# Patient Record
Sex: FEMALE | Race: WHITE | NOT HISPANIC OR LATINO | Employment: UNEMPLOYED | URBAN - METROPOLITAN AREA
[De-identification: names, ages, dates, MRNs, and addresses within clinical notes are randomized per-mention and may not be internally consistent; named-entity substitution may affect disease eponyms.]

---

## 2018-03-17 ENCOUNTER — HOSPITAL ENCOUNTER (OUTPATIENT)
Dept: MRI IMAGING | Facility: HOSPITAL | Age: 38
Discharge: HOME | End: 2018-03-17
Attending: NURSE PRACTITIONER | Admitting: NURSE PRACTITIONER

## 2018-03-26 ENCOUNTER — HOSPITAL ENCOUNTER (OUTPATIENT)
Dept: ONCOLOGY | Facility: CLINIC | Age: 38
Setting detail: INFUSION SERIES
Discharge: HOME | End: 2018-03-26
Attending: INTERNAL MEDICINE | Admitting: INTERNAL MEDICINE

## 2018-03-26 ENCOUNTER — CLINICAL SUPPORT (OUTPATIENT)
Dept: ONCOLOGY | Facility: HOSPITAL | Age: 38
End: 2018-03-26

## 2018-03-26 ENCOUNTER — HOSPITAL ENCOUNTER (OUTPATIENT)
Dept: ONCOLOGY | Facility: HOSPITAL | Age: 38
Discharge: HOME | End: 2018-03-26
Attending: INTERNAL MEDICINE | Admitting: INTERNAL MEDICINE

## 2018-03-26 NOTE — PROGRESS NOTES
PATIENTS ONCOLOGY RECORD LOCATED IN [facility]      Subjective     Name:  YANETH HOYT     Date:  2018  Address:  [address] [place] [address]  Home: [phone]  :  1980 AGE:  37 y.o.        RECORDS OBTAINED:  Patients Oncology Record is located in [facility]

## 2018-03-31 ENCOUNTER — HOSPITAL ENCOUNTER (OUTPATIENT)
Dept: ULTRASOUND IMAGING | Facility: HOSPITAL | Age: 38
Discharge: HOME | End: 2018-03-31
Attending: NURSE PRACTITIONER | Admitting: NURSE PRACTITIONER

## 2018-04-12 ENCOUNTER — HOSPITAL ENCOUNTER (OUTPATIENT)
Dept: LAB | Facility: HOSPITAL | Age: 38
Discharge: HOME | End: 2018-04-12
Attending: INTERNAL MEDICINE | Admitting: INTERNAL MEDICINE

## 2018-04-14 ENCOUNTER — HOSPITAL ENCOUNTER (OUTPATIENT)
Dept: CT IMAGING | Facility: HOSPITAL | Age: 38
Discharge: HOME | End: 2018-04-14
Attending: NURSE PRACTITIONER | Admitting: NURSE PRACTITIONER

## 2018-04-17 ENCOUNTER — CLINICAL SUPPORT (OUTPATIENT)
Dept: ONCOLOGY | Facility: HOSPITAL | Age: 38
End: 2018-04-17

## 2018-04-17 ENCOUNTER — HOSPITAL ENCOUNTER (OUTPATIENT)
Dept: ONCOLOGY | Facility: CLINIC | Age: 38
Setting detail: INFUSION SERIES
Discharge: HOME | End: 2018-04-17
Attending: INTERNAL MEDICINE | Admitting: INTERNAL MEDICINE

## 2018-04-17 NOTE — PROGRESS NOTES
PATIENTS ONCOLOGY RECORD LOCATED IN [facility]      Subjective     Name:  YANETH HOYT     Date:  2018  Address:  [address] [place] [address]  Home: [phone]  :  1980 AGE:  37 y.o.        RECORDS OBTAINED:  Patients Oncology Record is located in [facility]

## 2018-05-21 ENCOUNTER — CLINICAL SUPPORT (OUTPATIENT)
Dept: ONCOLOGY | Facility: HOSPITAL | Age: 38
End: 2018-05-21

## 2018-05-21 ENCOUNTER — HOSPITAL ENCOUNTER (OUTPATIENT)
Dept: LAB | Facility: HOSPITAL | Age: 38
Discharge: HOME | End: 2018-05-21
Attending: INTERNAL MEDICINE | Admitting: INTERNAL MEDICINE

## 2018-05-21 ENCOUNTER — HOSPITAL ENCOUNTER (OUTPATIENT)
Dept: ONCOLOGY | Facility: CLINIC | Age: 38
Setting detail: INFUSION SERIES
Discharge: HOME | End: 2018-05-21
Attending: INTERNAL MEDICINE | Admitting: INTERNAL MEDICINE

## 2018-05-21 NOTE — PROGRESS NOTES
PATIENTS ONCOLOGY RECORD LOCATED IN [facility]      Subjective     Name:  YANETH HOYT     Date:  2018  Address:  [address] [place] [address]  Home: [phone]  :  1980 AGE:  38 y.o.        RECORDS OBTAINED:  Patients Oncology Record is located in [facility]

## 2018-05-24 LAB
JAK2 P.V617F BLD/T QL: NORMAL

## 2019-06-11 ENCOUNTER — HOSPITAL ENCOUNTER (OUTPATIENT)
Dept: SLEEP MEDICINE | Facility: HOSPITAL | Age: 39
Discharge: HOME | End: 2019-06-11
Attending: INTERNAL MEDICINE | Admitting: INTERNAL MEDICINE

## 2019-06-11 ENCOUNTER — OUTSIDE FACILITY SERVICE (OUTPATIENT)
Dept: SLEEP MEDICINE | Facility: HOSPITAL | Age: 39
End: 2019-06-11

## 2019-06-11 PROCEDURE — OUTSIDEPOS PR OUTSIDE POS PLACEHOLDER: Performed by: INTERNAL MEDICINE

## 2019-06-15 ENCOUNTER — HOSPITAL ENCOUNTER (OUTPATIENT)
Dept: SLEEP MEDICINE | Facility: HOSPITAL | Age: 39
Discharge: HOME | End: 2019-06-15
Admitting: INTERNAL MEDICINE

## 2019-06-15 DIAGNOSIS — G47.10 HYPERSOMNIA: ICD-10-CM

## 2019-06-15 PROCEDURE — 95806 SLEEP STUDY UNATT&RESP EFFT: CPT | Performed by: INTERNAL MEDICINE

## 2019-06-15 PROCEDURE — 95811 POLYSOM 6/>YRS CPAP 4/> PARM: CPT

## 2019-06-15 PROCEDURE — 95806 SLEEP STUDY UNATT&RESP EFFT: CPT

## 2019-06-16 VITALS
OXYGEN SATURATION: 98 % | RESPIRATION RATE: 18 BRPM | HEIGHT: 61 IN | HEART RATE: 80 BPM | BODY MASS INDEX: 45.5 KG/M2 | WEIGHT: 241 LBS

## 2019-06-18 ENCOUNTER — OUTSIDE FACILITY SERVICE (OUTPATIENT)
Dept: SLEEP MEDICINE | Facility: HOSPITAL | Age: 39
End: 2019-06-18

## 2019-06-18 PROCEDURE — 95806 SLEEP STUDY UNATT&RESP EFFT: CPT | Performed by: INTERNAL MEDICINE

## 2019-09-09 ENCOUNTER — TRANSCRIBE ORDERS (OUTPATIENT)
Dept: MAMMOGRAPHY | Facility: HOSPITAL | Age: 39
End: 2019-09-09

## 2019-09-09 DIAGNOSIS — Z12.39 SCREENING BREAST EXAMINATION: Primary | ICD-10-CM

## 2019-09-12 ENCOUNTER — APPOINTMENT (OUTPATIENT)
Dept: MAMMOGRAPHY | Facility: HOSPITAL | Age: 39
End: 2019-09-12

## 2019-09-16 ENCOUNTER — HOSPITAL ENCOUNTER (OUTPATIENT)
Dept: MAMMOGRAPHY | Facility: HOSPITAL | Age: 39
Discharge: HOME | End: 2019-09-16
Admitting: NURSE PRACTITIONER

## 2019-09-16 DIAGNOSIS — Z12.39 SCREENING BREAST EXAMINATION: ICD-10-CM

## 2019-09-16 PROCEDURE — 77063 BREAST TOMOSYNTHESIS BI: CPT

## 2019-09-16 PROCEDURE — 77067 SCR MAMMO BI INCL CAD: CPT

## 2020-10-13 ENCOUNTER — TRANSCRIBE ORDERS (OUTPATIENT)
Dept: ADMINISTRATIVE | Facility: HOSPITAL | Age: 40
End: 2020-10-13

## 2020-10-13 ENCOUNTER — HOSPITAL ENCOUNTER (OUTPATIENT)
Dept: CARDIOLOGY | Facility: HOSPITAL | Age: 40
Discharge: HOME | End: 2020-10-13
Admitting: OBSTETRICS & GYNECOLOGY

## 2020-10-13 DIAGNOSIS — Z01.818 PRE-OP TESTING: ICD-10-CM

## 2020-10-13 DIAGNOSIS — Z01.818 PRE-OP TESTING: Primary | ICD-10-CM

## 2020-10-13 PROCEDURE — 93010 ELECTROCARDIOGRAM REPORT: CPT | Performed by: INTERNAL MEDICINE

## 2020-10-13 PROCEDURE — 93005 ELECTROCARDIOGRAM TRACING: CPT | Performed by: OBSTETRICS & GYNECOLOGY

## 2020-10-16 ENCOUNTER — TELEPHONE (OUTPATIENT)
Dept: CARDIOLOGY | Facility: CLINIC | Age: 40
End: 2020-10-16

## 2020-10-16 NOTE — TELEPHONE ENCOUNTER
RECEIVED REFERRAL/ CARDIAC CLEARANCE FROM [facility]. PATIENT'S SURGERY 10/20/2020. CALLED PATIENT, SHE SAID THERE WAS A MIX UP AND SHE NEEDS THIS APT AFTER HER SURGERY. DOES NOT NEED CARDIAC CLEARANCE. MADE APT 10/23/2020

## 2020-10-20 RX ORDER — TRIAMTERENE CAPSULES 50 MG/1
50 CAPSULE ORAL DAILY
COMMUNITY
Start: 2020-10-07 | End: 2020-11-06

## 2020-10-20 RX ORDER — BLOOD-GLUCOSE METER
1 EACH MISCELLANEOUS 2 TIMES DAILY
COMMUNITY
Start: 2020-10-06 | End: 2020-11-06 | Stop reason: ALTCHOICE

## 2020-10-20 RX ORDER — AMLODIPINE BESYLATE 10 MG/1
10 TABLET ORAL DAILY
COMMUNITY
Start: 2020-07-28 | End: 2020-11-06

## 2020-10-20 RX ORDER — FLUOXETINE HYDROCHLORIDE 40 MG/1
40 CAPSULE ORAL DAILY
COMMUNITY
Start: 2020-09-28

## 2020-10-20 RX ORDER — HYDROCHLOROTHIAZIDE 12.5 MG/1
12.5 TABLET ORAL DAILY
COMMUNITY
Start: 2020-10-06 | End: 2020-11-06 | Stop reason: ALTCHOICE

## 2020-10-20 RX ORDER — TRIAMTERENE AND HYDROCHLOROTHIAZIDE 37.5; 25 MG/1; MG/1
1 TABLET ORAL DAILY
COMMUNITY
Start: 2020-07-28

## 2020-10-20 RX ORDER — ONDANSETRON 4 MG/1
4 TABLET, FILM COATED ORAL 3 TIMES DAILY PRN
COMMUNITY
Start: 2020-10-06

## 2020-10-20 RX ORDER — IBUPROFEN 800 MG/1
800 TABLET ORAL 3 TIMES DAILY
COMMUNITY
Start: 2020-10-06

## 2020-11-06 ENCOUNTER — OFFICE VISIT (OUTPATIENT)
Dept: CARDIOLOGY | Facility: CLINIC | Age: 40
End: 2020-11-06

## 2020-11-06 VITALS
HEART RATE: 88 BPM | TEMPERATURE: 96.9 F | DIASTOLIC BLOOD PRESSURE: 89 MMHG | OXYGEN SATURATION: 100 % | SYSTOLIC BLOOD PRESSURE: 134 MMHG | HEIGHT: 61 IN | BODY MASS INDEX: 47.11 KG/M2 | WEIGHT: 249.5 LBS

## 2020-11-06 DIAGNOSIS — R06.02 SHORTNESS OF BREATH: ICD-10-CM

## 2020-11-06 DIAGNOSIS — G47.33 OBSTRUCTIVE SLEEP APNEA: ICD-10-CM

## 2020-11-06 DIAGNOSIS — I10 ESSENTIAL HYPERTENSION: Primary | ICD-10-CM

## 2020-11-06 PROBLEM — Z86.69 HISTORY OF MIGRAINE: Status: ACTIVE | Noted: 2020-11-06

## 2020-11-06 PROCEDURE — 99203 OFFICE O/P NEW LOW 30 MIN: CPT | Performed by: INTERNAL MEDICINE

## 2020-11-06 RX ORDER — LORATADINE 10 MG/1
TABLET ORAL
COMMUNITY
Start: 2020-10-13

## 2020-11-06 RX ORDER — TAMSULOSIN HYDROCHLORIDE 0.4 MG/1
1 CAPSULE ORAL
COMMUNITY
Start: 2020-10-27

## 2020-11-06 RX ORDER — OXYCODONE HYDROCHLORIDE AND ACETAMINOPHEN 5; 325 MG/1; MG/1
1 TABLET ORAL EVERY 4 HOURS PRN
COMMUNITY
Start: 2020-10-28

## 2020-11-06 NOTE — PROGRESS NOTES
"    Subjective:     Encounter Date:11/06/2020      Patient ID: Nataliya Wesley is a 40 y.o. female.    Chief Complaint:  History of Present Illness-40 year-old white female with history of hypertension and status post hysterectomy history of sleep apnea presents to my office for a new consultation.  Patient has been having symptoms of shortness of breath and occasional dizziness.  No complains of any chest pain.  No PND orthopnea.  No palpitations syncope.  She has some swelling of the feet.  She has very high blood pressure and was placed on medical therapy but she still continues to have high blood pressure.  She says that she is taking her meds regularly.  She is also using a sleep apnea machine.  She is notable to exercise because of her recent hysterectomy.  /89   Pulse 88   Temp 96.9 °F (36.1 °C)   Ht 154.9 cm (61\")   Wt 113 kg (249 lb 8 oz)   SpO2 100%   BMI 47.14 kg/m²     The following portions of the patient's history were reviewed and updated as appropriate: allergies, current medications, past family history, past medical history, past social history, past surgical history and problem list.  Past Medical History:   Diagnosis Date   • Hypertension      Past Surgical History:   Procedure Laterality Date   • HYSTERECTOMY  10/20/2020     Social History     Socioeconomic History   • Marital status:      Spouse name: Not on file   • Number of children: Not on file   • Years of education: Not on file   • Highest education level: Not on file   Tobacco Use   • Smoking status: Never Smoker   • Smokeless tobacco: Never Used   Substance and Sexual Activity   • Alcohol use: Not Currently     Comment: once a year    • Drug use: Never     Family History   Problem Relation Age of Onset   • Hypertension Mother        Current Outpatient Medications:   •  FLUoxetine (PROzac) 40 MG capsule, Take 40 mg by mouth Daily., Disp: , Rfl:   •  ibuprofen (ADVIL,MOTRIN) 800 MG tablet, Take 800 mg by mouth 3 " (Three) Times a Day., Disp: , Rfl:   •  loratadine (CLARITIN) 10 MG tablet, , Disp: , Rfl:   •  Mirabegron ER (Myrbetriq) 25 MG tablet sustained-release 24 hour 24 hr tablet, Take 25 mg by mouth Daily., Disp: , Rfl:   •  ondansetron (ZOFRAN) 4 MG tablet, Take 4 mg by mouth 3 (Three) Times a Day As Needed., Disp: , Rfl:   •  oxyCODONE-acetaminophen (PERCOCET) 5-325 MG per tablet, Take 1 tablet by mouth Every 4 (Four) Hours As Needed., Disp: , Rfl:   •  tamsulosin (FLOMAX) 0.4 MG capsule 24 hr capsule, Take 1 capsule by mouth every night at bedtime., Disp: , Rfl:   •  triamterene-hydrochlorothiazide (MAXZIDE-25) 37.5-25 MG per tablet, Take 1 tablet by mouth Daily., Disp: , Rfl:   No Known Allergies    Review of Systems   Constitution: Negative for malaise/fatigue.   HENT: Negative for ear pain and nosebleeds.    Cardiovascular: Positive for leg swelling (ankles and feet). Negative for chest pain, palpitations and syncope.   Respiratory: Positive for shortness of breath. Negative for cough.    Hematologic/Lymphatic: Bruises/bleeds easily.   Skin: Negative for rash.   Musculoskeletal: Negative for joint pain.   Gastrointestinal: Positive for nausea. Negative for abdominal pain and vomiting.   Neurological: Positive for light-headedness. Negative for headaches and numbness.   Psychiatric/Behavioral: Negative for depression. The patient is not nervous/anxious.               Objective:     Constitutional:       Appearance: Well-developed.   Eyes:      General: No scleral icterus.     Conjunctiva/sclera: Conjunctivae normal.      Pupils: Pupils are equal, round, and reactive to light.   HENT:      Head: Normocephalic and atraumatic.   Neck:      Musculoskeletal: Normal range of motion and neck supple.      Vascular: No carotid bruit or JVD.   Pulmonary:      Effort: Pulmonary effort is normal.      Breath sounds: Normal breath sounds. No wheezing. No rales.   Cardiovascular:      Normal rate. Regular rhythm.   Pulses:      Intact distal pulses.   Abdominal:      General: Bowel sounds are normal.      Palpations: Abdomen is soft.   Musculoskeletal: Normal range of motion.   Skin:     General: Skin is warm and dry.      Findings: No rash.   Neurological:      Mental Status: Alert.      Comments: No focal deficits         Procedures    Lab Review:       Assessment:          Diagnosis Plan   1. Essential hypertension     2. Shortness of breath     3. Obstructive sleep apnea            Plan:      Patient has history of hypertension her blood pressure still high in spite of being on 2 medications  Pain is sleep apnea using a CPAP machine  Patient also has been having some shortness of breath  Patient will be started on metoprolol 1 mg once a day and will have an echocardiogram for LV function valve abnormalities  Patient is advised to exercise regularly and decrease the salt intake and also use a CPAP machine regularly.

## 2020-11-06 NOTE — PROGRESS NOTES
"Encounter Date:11/06/2020      Patient ID: Nataliya Wesley is a 40 y.o. female.    Chief Complaint:      History of Present Illness      Assessment and Plan               No diagnosis found.LAB RESULTS (LAST 7 DAYS)    CBC        BMP        CMP         BNP        TROPONIN        CoAg        Creatinine Clearance  CrCl cannot be calculated (No successful lab value found.).    ABG        Radiology  No radiology results for the last day                The following portions of the patient's history were reviewed and updated as appropriate: {history reviewed:20406::\"allergies\",\"current medications\",\"past family history\",\"past medical history\",\"past social history\",\"past surgical history\",\"problem list\"}.    Review of Systems   Constitution: Negative for malaise/fatigue.   Cardiovascular: Negative for chest pain, leg swelling, palpitations and syncope.   Respiratory: Negative for shortness of breath.    Skin: Negative for rash.   Gastrointestinal: Negative for nausea and vomiting.   Neurological: Negative for dizziness, light-headedness and numbness.         Current Outpatient Medications:   •  amLODIPine (NORVASC) 10 MG tablet, Take 10 mg by mouth Daily., Disp: , Rfl:   •  Blood Glucose Monitoring Suppl (Accu-Chek Guide) w/Device kit, 1 strip by Other route 2 (Two) Times a Day. to check glucose, Disp: , Rfl:   •  FLUoxetine (PROzac) 40 MG capsule, Take 40 mg by mouth Daily., Disp: , Rfl:   •  hydroCHLOROthiazide (HYDRODIURIL) 12.5 MG tablet, Take 12.5 mg by mouth Daily., Disp: , Rfl:   •  ibuprofen (ADVIL,MOTRIN) 800 MG tablet, Take 800 mg by mouth 3 (Three) Times a Day., Disp: , Rfl:   •  ondansetron (ZOFRAN) 4 MG tablet, Take 4 mg by mouth 3 (Three) Times a Day As Needed., Disp: , Rfl:   •  triamterene (DYRENIUM) 50 MG capsule, Take 50 mg by mouth Daily., Disp: , Rfl:   •  triamterene-hydrochlorothiazide (MAXZIDE-25) 37.5-25 MG per tablet, Take 1 tablet by mouth Daily., Disp: , Rfl:     No Known Allergies    History " reviewed. No pertinent family history.    History reviewed. No pertinent surgical history.    Past Medical History:   Diagnosis Date   • Hypertension        History reviewed. No pertinent family history.    Social History     Socioeconomic History   • Marital status:      Spouse name: Not on file   • Number of children: Not on file   • Years of education: Not on file   • Highest education level: Not on file         Procedures      Objective:       Physical Exam    Temp 96.9 °F (36.1 °C)   The patient is alert, oriented and in no distress.    Vital signs as noted above.    Head and neck revealed no carotid bruits or jugular venous distension.  No thyromegaly or lymphadenopathy is present.    Lungs clear.  No wheezing.  Breath sounds are normal bilaterally.    Heart normal first and second heart sounds.  No murmur..  No pericardial rub is present.  No gallop is present.    Abdomen soft and nontender.  No organomegaly is present.    Extremities revealed good peripheral pulses without any pedal edema.    Skin warm and dry.    Musculoskeletal system is grossly normal.    CNS grossly normal.

## 2020-11-30 ENCOUNTER — APPOINTMENT (OUTPATIENT)
Dept: CARDIOLOGY | Facility: HOSPITAL | Age: 40
End: 2020-11-30

## 2021-08-31 RX ORDER — METOPROLOL SUCCINATE 25 MG/1
TABLET, EXTENDED RELEASE ORAL
Qty: 30 TABLET | Refills: 0 | Status: SHIPPED | OUTPATIENT
Start: 2021-08-31

## 2021-08-31 NOTE — TELEPHONE ENCOUNTER
Rx Refill Note  Requested Prescriptions     Pending Prescriptions Disp Refills   • metoprolol succinate XL (TOPROL-XL) 25 MG 24 hr tablet [Pharmacy Med Name: Metoprolol Succinate ER 25 MG Oral Tablet Extended Release 24 Hour] 30 tablet 0     Sig: Take 1 tablet by mouth once daily      Last office visit with prescribing clinician: 11/6/2020      Next office visit with prescribing clinician: Patient to follow-up as needed            Janay Puente MA  08/31/21, 10:58 EDT

## 2021-09-10 ENCOUNTER — HOSPITAL ENCOUNTER (OUTPATIENT)
Dept: CARDIOLOGY | Facility: HOSPITAL | Age: 41
Discharge: HOME | End: 2021-09-10
Admitting: INTERNAL MEDICINE

## 2021-09-10 VITALS
DIASTOLIC BLOOD PRESSURE: 78 MMHG | HEIGHT: 61 IN | WEIGHT: 249 LBS | BODY MASS INDEX: 47.01 KG/M2 | SYSTOLIC BLOOD PRESSURE: 135 MMHG

## 2021-09-10 DIAGNOSIS — G47.33 OBSTRUCTIVE SLEEP APNEA: ICD-10-CM

## 2021-09-10 DIAGNOSIS — I10 ESSENTIAL HYPERTENSION: ICD-10-CM

## 2021-09-10 DIAGNOSIS — R06.02 SHORTNESS OF BREATH: ICD-10-CM

## 2021-09-10 LAB
BH CV ECHO MEAS - AO MAX PG (FULL): 6.7 MMHG
BH CV ECHO MEAS - AO MAX PG: 11.2 MMHG
BH CV ECHO MEAS - AO MEAN PG (FULL): 4.3 MMHG
BH CV ECHO MEAS - AO MEAN PG: 6.5 MMHG
BH CV ECHO MEAS - AO ROOT AREA (BSA CORRECTED): 1.4
BH CV ECHO MEAS - AO ROOT AREA: 6.4 CM^2
BH CV ECHO MEAS - AO ROOT DIAM: 2.9 CM
BH CV ECHO MEAS - AO V2 MAX: 167.2 CM/SEC
BH CV ECHO MEAS - AO V2 MEAN: 123.3 CM/SEC
BH CV ECHO MEAS - AO V2 VTI: 31.1 CM
BH CV ECHO MEAS - ASC AORTA: 2.8 CM
BH CV ECHO MEAS - AVA(I,A): 2.5 CM^2
BH CV ECHO MEAS - AVA(I,D): 2.5 CM^2
BH CV ECHO MEAS - AVA(V,A): 2.5 CM^2
BH CV ECHO MEAS - AVA(V,D): 2.5 CM^2
BH CV ECHO MEAS - BSA(HAYCOCK): 2.3 M^2
BH CV ECHO MEAS - BSA: 2.1 M^2
BH CV ECHO MEAS - BZI_BMI: 48.4 KILOGRAMS/M^2
BH CV ECHO MEAS - BZI_METRIC_HEIGHT: 154.9 CM
BH CV ECHO MEAS - BZI_METRIC_WEIGHT: 116.1 KG
BH CV ECHO MEAS - EDV(CUBED): 48.7 ML
BH CV ECHO MEAS - EDV(MOD-SP4): 48.3 ML
BH CV ECHO MEAS - EDV(TEICH): 56.3 ML
BH CV ECHO MEAS - EF(CUBED): 62 %
BH CV ECHO MEAS - EF(MOD-SP4): 47.4 %
BH CV ECHO MEAS - EF(TEICH): 54.5 %
BH CV ECHO MEAS - ESV(CUBED): 18.5 ML
BH CV ECHO MEAS - ESV(MOD-SP4): 25.4 ML
BH CV ECHO MEAS - ESV(TEICH): 25.6 ML
BH CV ECHO MEAS - FS: 27.6 %
BH CV ECHO MEAS - IVS/LVPW: 1.6
BH CV ECHO MEAS - IVSD: 1.1 CM
BH CV ECHO MEAS - LA DIMENSION: 2.7 CM
BH CV ECHO MEAS - LA/AO: 0.93
BH CV ECHO MEAS - LV DIASTOLIC VOL/BSA (35-75): 23 ML/M^2
BH CV ECHO MEAS - LV MASS(C)D: 96.3 GRAMS
BH CV ECHO MEAS - LV MASS(C)DI: 45.9 GRAMS/M^2
BH CV ECHO MEAS - LV MAX PG: 4.5 MMHG
BH CV ECHO MEAS - LV MEAN PG: 2.1 MMHG
BH CV ECHO MEAS - LV SYSTOLIC VOL/BSA (12-30): 12.1 ML/M^2
BH CV ECHO MEAS - LV V1 MAX: 106.3 CM/SEC
BH CV ECHO MEAS - LV V1 MEAN: 68.5 CM/SEC
BH CV ECHO MEAS - LV V1 VTI: 19.7 CM
BH CV ECHO MEAS - LVIDD: 3.7 CM
BH CV ECHO MEAS - LVIDS: 2.6 CM
BH CV ECHO MEAS - LVOT AREA: 3.9 CM^2
BH CV ECHO MEAS - LVOT DIAM: 2.2 CM
BH CV ECHO MEAS - LVPWD: 0.71 CM
BH CV ECHO MEAS - MV A MAX VEL: 64.7 CM/SEC
BH CV ECHO MEAS - MV DEC SLOPE: 386.9 CM/SEC^2
BH CV ECHO MEAS - MV DEC TIME: 0.2 SEC
BH CV ECHO MEAS - MV E MAX VEL: 78.4 CM/SEC
BH CV ECHO MEAS - MV E/A: 1.2
BH CV ECHO MEAS - MV MAX PG: 2.6 MMHG
BH CV ECHO MEAS - MV MEAN PG: 1.4 MMHG
BH CV ECHO MEAS - MV V2 MAX: 80.4 CM/SEC
BH CV ECHO MEAS - MV V2 MEAN: 58.6 CM/SEC
BH CV ECHO MEAS - MV V2 VTI: 19.3 CM
BH CV ECHO MEAS - MVA(VTI): 4 CM^2
BH CV ECHO MEAS - PA ACC TIME: 0.08 SEC
BH CV ECHO MEAS - PA PR(ACCEL): 43.2 MMHG
BH CV ECHO MEAS - RV MAX PG: 3.3 MMHG
BH CV ECHO MEAS - RV MEAN PG: 1.6 MMHG
BH CV ECHO MEAS - RV V1 MAX: 91 CM/SEC
BH CV ECHO MEAS - RV V1 MEAN: 60.2 CM/SEC
BH CV ECHO MEAS - RV V1 VTI: 20.8 CM
BH CV ECHO MEAS - SI(AO): 94.8 ML/M^2
BH CV ECHO MEAS - SI(CUBED): 14.4 ML/M^2
BH CV ECHO MEAS - SI(LVOT): 36.4 ML/M^2
BH CV ECHO MEAS - SI(MOD-SP4): 10.9 ML/M^2
BH CV ECHO MEAS - SI(TEICH): 14.6 ML/M^2
BH CV ECHO MEAS - SV(AO): 199 ML
BH CV ECHO MEAS - SV(CUBED): 30.2 ML
BH CV ECHO MEAS - SV(LVOT): 76.4 ML
BH CV ECHO MEAS - SV(MOD-SP4): 22.9 ML
BH CV ECHO MEAS - SV(TEICH): 30.7 ML
MAXIMAL PREDICTED HEART RATE: 178 BPM
STRESS TARGET HR: 151 BPM

## 2021-09-10 PROCEDURE — 93306 TTE W/DOPPLER COMPLETE: CPT

## 2021-09-10 PROCEDURE — 93306 TTE W/DOPPLER COMPLETE: CPT | Performed by: INTERNAL MEDICINE

## 2022-01-06 RX ORDER — METOPROLOL SUCCINATE 25 MG/1
TABLET, EXTENDED RELEASE ORAL
Qty: 30 TABLET | Refills: 0 | OUTPATIENT
Start: 2022-01-06

## 2022-01-06 NOTE — TELEPHONE ENCOUNTER
Rx Refill Note  Requested Prescriptions     Pending Prescriptions Disp Refills   • metoprolol succinate XL (TOPROL-XL) 25 MG 24 hr tablet [Pharmacy Med Name: Metoprolol Succinate ER 25 MG Oral Tablet Extended Release 24 Hour] 30 tablet 0     Sig: Take 1 tablet by mouth once daily      Last office visit with prescribing clinician: 11/6/2020      Next office visit with prescribing clinician: Visit date not found            Celena Messer MA  01/06/22, 08:25 EST

## 2022-05-20 RX ORDER — METOPROLOL SUCCINATE 25 MG/1
TABLET, EXTENDED RELEASE ORAL
Qty: 30 TABLET | Refills: 0 | OUTPATIENT
Start: 2022-05-20

## 2022-05-20 NOTE — TELEPHONE ENCOUNTER
Rx Refill Note  Requested Prescriptions     Pending Prescriptions Disp Refills   • metoprolol succinate XL (TOPROL-XL) 25 MG 24 hr tablet [Pharmacy Med Name: Metoprolol Succinate ER 25 MG Oral Tablet Extended Release 24 Hour] 30 tablet 0     Sig: Take 1 tablet by mouth once daily      Last office visit with prescribing clinician: 11/6/2020      Next office visit with prescribing clinician: Visit date not found            Hallie Parmar MA  05/20/22, 12:47 EDT

## 2022-12-06 ENCOUNTER — HOSPITAL ENCOUNTER (EMERGENCY)
Facility: HOSPITAL | Age: 42
Discharge: HOME | End: 2022-12-06
Attending: EMERGENCY MEDICINE | Admitting: EMERGENCY MEDICINE

## 2022-12-06 ENCOUNTER — APPOINTMENT (OUTPATIENT)
Dept: CT IMAGING | Facility: HOSPITAL | Age: 42
End: 2022-12-06

## 2022-12-06 VITALS
BODY MASS INDEX: 48.87 KG/M2 | HEIGHT: 61 IN | DIASTOLIC BLOOD PRESSURE: 86 MMHG | TEMPERATURE: 98 F | OXYGEN SATURATION: 100 % | RESPIRATION RATE: 16 BRPM | WEIGHT: 258.82 LBS | HEART RATE: 78 BPM | SYSTOLIC BLOOD PRESSURE: 157 MMHG

## 2022-12-06 DIAGNOSIS — N23 URETERAL COLIC: Primary | ICD-10-CM

## 2022-12-06 DIAGNOSIS — N20.1 URETERAL CALCULUS: ICD-10-CM

## 2022-12-06 LAB
ALBUMIN SERPL-MCNC: 4.2 G/DL (ref 3.5–5.2)
ALBUMIN/GLOB SERPL: 1.3 G/DL
ALP SERPL-CCNC: 100 U/L (ref 39–117)
ALT SERPL W P-5'-P-CCNC: 8 U/L (ref 1–33)
ANION GAP SERPL CALCULATED.3IONS-SCNC: 12 MMOL/L (ref 5–15)
AST SERPL-CCNC: 12 U/L (ref 1–32)
BACTERIA UR QL AUTO: ABNORMAL /HPF
BASOPHILS # BLD AUTO: 0 10*3/MM3 (ref 0–0.2)
BASOPHILS NFR BLD AUTO: 0.3 % (ref 0–1.5)
BILIRUB SERPL-MCNC: 0.4 MG/DL (ref 0–1.2)
BILIRUB UR QL STRIP: NEGATIVE
BUN SERPL-MCNC: 15 MG/DL (ref 6–20)
BUN/CREAT SERPL: 23.4 (ref 7–25)
CALCIUM SPEC-SCNC: 9.6 MG/DL (ref 8.6–10.5)
CHLORIDE SERPL-SCNC: 104 MMOL/L (ref 98–107)
CLARITY UR: ABNORMAL
CO2 SERPL-SCNC: 26 MMOL/L (ref 22–29)
COLOR UR: YELLOW
CREAT SERPL-MCNC: 0.64 MG/DL (ref 0.57–1)
DEPRECATED RDW RBC AUTO: 43.8 FL (ref 37–54)
EGFRCR SERPLBLD CKD-EPI 2021: 112.6 ML/MIN/1.73
EOSINOPHIL # BLD AUTO: 0.2 10*3/MM3 (ref 0–0.4)
EOSINOPHIL NFR BLD AUTO: 0.9 % (ref 0.3–6.2)
ERYTHROCYTE [DISTWIDTH] IN BLOOD BY AUTOMATED COUNT: 14 % (ref 12.3–15.4)
GLOBULIN UR ELPH-MCNC: 3.3 GM/DL
GLUCOSE SERPL-MCNC: 101 MG/DL (ref 65–99)
GLUCOSE UR STRIP-MCNC: NEGATIVE MG/DL
HCT VFR BLD AUTO: 36.2 % (ref 34–46.6)
HGB BLD-MCNC: 12.3 G/DL (ref 12–15.9)
HGB UR QL STRIP.AUTO: ABNORMAL
HOLD SPECIMEN: NORMAL
HYALINE CASTS UR QL AUTO: ABNORMAL /LPF
KETONES UR QL STRIP: ABNORMAL
LEUKOCYTE ESTERASE UR QL STRIP.AUTO: ABNORMAL
LIPASE SERPL-CCNC: 14 U/L (ref 13–60)
LYMPHOCYTES # BLD AUTO: 3.6 10*3/MM3 (ref 0.7–3.1)
LYMPHOCYTES NFR BLD AUTO: 20.2 % (ref 19.6–45.3)
MCH RBC QN AUTO: 28.7 PG (ref 26.6–33)
MCHC RBC AUTO-ENTMCNC: 33.9 G/DL (ref 31.5–35.7)
MCV RBC AUTO: 84.7 FL (ref 79–97)
MONOCYTES # BLD AUTO: 0.6 10*3/MM3 (ref 0.1–0.9)
MONOCYTES NFR BLD AUTO: 3.5 % (ref 5–12)
NEUTROPHILS NFR BLD AUTO: 13.2 10*3/MM3 (ref 1.7–7)
NEUTROPHILS NFR BLD AUTO: 75.1 % (ref 42.7–76)
NITRITE UR QL STRIP: NEGATIVE
NRBC BLD AUTO-RTO: 0.1 /100 WBC (ref 0–0.2)
PH UR STRIP.AUTO: <=5 [PH] (ref 5–8)
PLATELET # BLD AUTO: 370 10*3/MM3 (ref 140–450)
PMV BLD AUTO: 9.2 FL (ref 6–12)
POTASSIUM SERPL-SCNC: 4 MMOL/L (ref 3.5–5.2)
PROT SERPL-MCNC: 7.5 G/DL (ref 6–8.5)
PROT UR QL STRIP: ABNORMAL
RBC # BLD AUTO: 4.28 10*6/MM3 (ref 3.77–5.28)
RBC # UR STRIP: ABNORMAL /HPF
REF LAB TEST METHOD: ABNORMAL
SODIUM SERPL-SCNC: 142 MMOL/L (ref 136–145)
SP GR UR STRIP: 1.03 (ref 1–1.03)
SQUAMOUS #/AREA URNS HPF: ABNORMAL /HPF
UROBILINOGEN UR QL STRIP: ABNORMAL
WBC # UR STRIP: ABNORMAL /HPF
WBC NRBC COR # BLD: 17.6 10*3/MM3 (ref 3.4–10.8)

## 2022-12-06 PROCEDURE — 83690 ASSAY OF LIPASE: CPT | Performed by: NURSE PRACTITIONER

## 2022-12-06 PROCEDURE — 81003 URINALYSIS AUTO W/O SCOPE: CPT | Performed by: NURSE PRACTITIONER

## 2022-12-06 PROCEDURE — 96374 THER/PROPH/DIAG INJ IV PUSH: CPT

## 2022-12-06 PROCEDURE — 80053 COMPREHEN METABOLIC PANEL: CPT | Performed by: NURSE PRACTITIONER

## 2022-12-06 PROCEDURE — 25010000002 KETOROLAC TROMETHAMINE PER 15 MG: Performed by: NURSE PRACTITIONER

## 2022-12-06 PROCEDURE — 85025 COMPLETE CBC W/AUTO DIFF WBC: CPT | Performed by: NURSE PRACTITIONER

## 2022-12-06 PROCEDURE — 81001 URINALYSIS AUTO W/SCOPE: CPT | Performed by: NURSE PRACTITIONER

## 2022-12-06 PROCEDURE — 74176 CT ABD & PELVIS W/O CONTRAST: CPT

## 2022-12-06 PROCEDURE — 99283 EMERGENCY DEPT VISIT LOW MDM: CPT

## 2022-12-06 PROCEDURE — 25010000002 ONDANSETRON PER 1 MG: Performed by: NURSE PRACTITIONER

## 2022-12-06 PROCEDURE — 96375 TX/PRO/DX INJ NEW DRUG ADDON: CPT

## 2022-12-06 RX ORDER — SODIUM CHLORIDE 0.9 % (FLUSH) 0.9 %
10 SYRINGE (ML) INJECTION AS NEEDED
Status: DISCONTINUED | OUTPATIENT
Start: 2022-12-06 | End: 2022-12-06 | Stop reason: HOSPADM

## 2022-12-06 RX ORDER — HYDROCODONE BITARTRATE AND ACETAMINOPHEN 5; 325 MG/1; MG/1
1 TABLET ORAL EVERY 6 HOURS PRN
Qty: 12 TABLET | Refills: 0 | Status: SHIPPED | OUTPATIENT
Start: 2022-12-06

## 2022-12-06 RX ORDER — ONDANSETRON 2 MG/ML
4 INJECTION INTRAMUSCULAR; INTRAVENOUS ONCE
Status: COMPLETED | OUTPATIENT
Start: 2022-12-06 | End: 2022-12-06

## 2022-12-06 RX ORDER — KETOROLAC TROMETHAMINE 15 MG/ML
15 INJECTION, SOLUTION INTRAMUSCULAR; INTRAVENOUS ONCE
Status: COMPLETED | OUTPATIENT
Start: 2022-12-06 | End: 2022-12-06

## 2022-12-06 RX ADMIN — ONDANSETRON 4 MG: 2 INJECTION INTRAMUSCULAR; INTRAVENOUS at 17:32

## 2022-12-06 RX ADMIN — KETOROLAC TROMETHAMINE 15 MG: 15 INJECTION, SOLUTION INTRAMUSCULAR; INTRAVENOUS at 17:33

## 2022-12-06 RX ADMIN — SODIUM CHLORIDE, POTASSIUM CHLORIDE, SODIUM LACTATE AND CALCIUM CHLORIDE 1000 ML: 600; 310; 30; 20 INJECTION, SOLUTION INTRAVENOUS at 18:06

## 2022-12-06 NOTE — ED NOTES
Patient evaluated by provider and will return to waiting room with Intravenous line in place.  Patient has been instructed not to inject anything into IV, or leave premises with line in place. Patient pending further evaluation, treatment, testing / monitoring.

## 2022-12-06 NOTE — ED PROVIDER NOTES
Subjective      Provider in Triage Note  42-year-old female presents with complaint of right flank pain since this morning. Denies urinary symptoms.  Patient reports nausea, denies vomiting diarrhea. Denies fever, sweats, chills. She has a history of kidney stones.       I interviewed the patient for HPI and agree with the nurse practitioner 8 provider triage note as noted above    Review of Systems   Constitutional: Negative for chills and fever.   HENT: Negative for congestion and sore throat.    Eyes: Negative for visual disturbance.   Respiratory: Negative for cough and shortness of breath.    Cardiovascular: Negative for chest pain.   Gastrointestinal: Positive for abdominal pain. Negative for diarrhea and vomiting.   Endocrine: Negative for polyuria.   Genitourinary: Positive for flank pain. Negative for dysuria.   Musculoskeletal: Positive for back pain.   Neurological: Negative for dizziness and headaches.       Past Medical History:   Diagnosis Date   • Hypertension        No Known Allergies    Past Surgical History:   Procedure Laterality Date   • HYSTERECTOMY  10/20/2020       Family History   Problem Relation Age of Onset   • Hypertension Mother        Social History     Socioeconomic History   • Marital status:    Tobacco Use   • Smoking status: Never   • Smokeless tobacco: Never   Substance and Sexual Activity   • Alcohol use: Not Currently     Comment: once a year    • Drug use: Never           Objective   Physical Exam  HEENT exam shows TMs to be clear.  Oropharynx comers.  Neck has no adenopathy.  Lungs are clear.  Heart has regular rhythm without murmur.  Abdomen soft nontender.  Back has mild left flank tenderness on palpation.  Extremity exam is unremarkable.  Procedures           ED Course      Results for orders placed or performed during the hospital encounter of 12/06/22   Comprehensive Metabolic Panel    Specimen: Blood   Result Value Ref Range    Glucose 101 (H) 65 - 99 mg/dL    BUN  15 6 - 20 mg/dL    Creatinine 0.64 0.57 - 1.00 mg/dL    Sodium 142 136 - 145 mmol/L    Potassium 4.0 3.5 - 5.2 mmol/L    Chloride 104 98 - 107 mmol/L    CO2 26.0 22.0 - 29.0 mmol/L    Calcium 9.6 8.6 - 10.5 mg/dL    Total Protein 7.5 6.0 - 8.5 g/dL    Albumin 4.20 3.50 - 5.20 g/dL    ALT (SGPT) 8 1 - 33 U/L    AST (SGOT) 12 1 - 32 U/L    Alkaline Phosphatase 100 39 - 117 U/L    Total Bilirubin 0.4 0.0 - 1.2 mg/dL    Globulin 3.3 gm/dL    A/G Ratio 1.3 g/dL    BUN/Creatinine Ratio 23.4 7.0 - 25.0    Anion Gap 12.0 5.0 - 15.0 mmol/L    eGFR 112.6 >60.0 mL/min/1.73   Lipase    Specimen: Blood   Result Value Ref Range    Lipase 14 13 - 60 U/L   Urinalysis With Microscopic If Indicated (No Culture) - Urine, Clean Catch    Specimen: Urine, Clean Catch   Result Value Ref Range    Color, UA Yellow Yellow, Straw    Appearance, UA Cloudy (A) Clear    pH, UA <=5.0 5.0 - 8.0    Specific Gravity, UA 1.026 1.005 - 1.030    Glucose, UA Negative Negative    Ketones, UA Trace (A) Negative    Bilirubin, UA Negative Negative    Blood, UA Moderate (2+) (A) Negative    Protein,  mg/dL (2+) (A) Negative    Leuk Esterase, UA Small (1+) (A) Negative    Nitrite, UA Negative Negative    Urobilinogen, UA 1.0 E.U./dL 0.2 - 1.0 E.U./dL   CBC Auto Differential    Specimen: Blood   Result Value Ref Range    WBC 17.60 (H) 3.40 - 10.80 10*3/mm3    RBC 4.28 3.77 - 5.28 10*6/mm3    Hemoglobin 12.3 12.0 - 15.9 g/dL    Hematocrit 36.2 34.0 - 46.6 %    MCV 84.7 79.0 - 97.0 fL    MCH 28.7 26.6 - 33.0 pg    MCHC 33.9 31.5 - 35.7 g/dL    RDW 14.0 12.3 - 15.4 %    RDW-SD 43.8 37.0 - 54.0 fl    MPV 9.2 6.0 - 12.0 fL    Platelets 370 140 - 450 10*3/mm3    Neutrophil % 75.1 42.7 - 76.0 %    Lymphocyte % 20.2 19.6 - 45.3 %    Monocyte % 3.5 (L) 5.0 - 12.0 %    Eosinophil % 0.9 0.3 - 6.2 %    Basophil % 0.3 0.0 - 1.5 %    Neutrophils, Absolute 13.20 (H) 1.70 - 7.00 10*3/mm3    Lymphocytes, Absolute 3.60 (H) 0.70 - 3.10 10*3/mm3    Monocytes, Absolute 0.60  0.10 - 0.90 10*3/mm3    Eosinophils, Absolute 0.20 0.00 - 0.40 10*3/mm3    Basophils, Absolute 0.00 0.00 - 0.20 10*3/mm3    nRBC 0.1 0.0 - 0.2 /100 WBC   Urinalysis, Microscopic Only - Urine, Clean Catch    Specimen: Urine, Clean Catch   Result Value Ref Range    RBC, UA 6-12 (A) None Seen /HPF    WBC, UA 21-30 (A) None Seen /HPF    Bacteria, UA Trace (A) None Seen /HPF    Squamous Epithelial Cells, UA 3-6 (A) None Seen, 0-2 /HPF    Hyaline Casts, UA 0-2 None Seen /LPF    Methodology Manual Light Microscopy    Green Top (Gel)   Result Value Ref Range    Extra Tube Hold for add-ons.      CT Abdomen Pelvis Without Contrast    Result Date: 12/6/2022  1.     11 mm calculus in the left renal pelvis with mild hydronephrosis.  2.     Mild left peripelvic fat stranding which could be due to obstruction or pyelitis. 3.     Small nonobstructing right renal calculi.    Electronically Signed By-Bob Cee MD On:12/6/2022 8:17 PM This report was finalized on [id number] by  Bob Cee MD.                                         MDM  Number of Diagnoses or Management Options  Diagnosis management comments: Patient has ureteral calculus with colic.  She has very minimal pain on evaluation and has had this pain for the past 1 month.  She will be discharged with a diagnosis of hydrocodone.  She will follow with the on-call urologist.       Amount and/or Complexity of Data Reviewed  Clinical lab tests: reviewed  Tests in the radiology section of CPT®: reviewed    Risk of Complications, Morbidity, and/or Mortality  Presenting problems: high  Diagnostic procedures: high  Management options: high    Patient Progress  Patient progress: stable      Final diagnoses:   Ureteral colic   Ureteral calculus       ED Disposition  ED Disposition     ED Disposition   Discharge    Condition   Stable    Comment   --             No follow-up provider specified.       Medication List      New Prescriptions    HYDROcodone-acetaminophen 5-325  MG per tablet  Commonly known as: NORCO  Take 1 tablet by mouth Every 6 (Six) Hours As Needed for Moderate Pain.           Where to Get Your Medications      These medications were sent to [id number] - [place], [place] - [address] - [phone]  - [phone]   [address] [facility] [place] [address]    Phone: [phone]   · HYDROcodone-acetaminophen 5-325 MG per tablet          Bob Barrios MD  12/06/22 [id number]

## 2022-12-13 ENCOUNTER — TRANSCRIBE ORDERS (OUTPATIENT)
Dept: ADMINISTRATIVE | Facility: HOSPITAL | Age: 42
End: 2022-12-13

## 2022-12-13 ENCOUNTER — LAB (OUTPATIENT)
Dept: LAB | Facility: HOSPITAL | Age: 42
End: 2022-12-13

## 2022-12-13 DIAGNOSIS — I10 HYPERTENSION, ESSENTIAL: ICD-10-CM

## 2022-12-13 DIAGNOSIS — Z13.29 SCREENING FOR THYROID DISORDER: Primary | ICD-10-CM

## 2022-12-13 DIAGNOSIS — Z13.29 SCREENING FOR THYROID DISORDER: ICD-10-CM

## 2022-12-13 LAB
ANION GAP SERPL CALCULATED.3IONS-SCNC: 8.4 MMOL/L (ref 5–15)
BASOPHILS # BLD AUTO: 0.04 10*3/MM3 (ref 0–0.2)
BASOPHILS NFR BLD AUTO: 0.3 % (ref 0–1.5)
BUN SERPL-MCNC: 18 MG/DL (ref 6–20)
BUN/CREAT SERPL: 27.3 (ref 7–25)
CALCIUM SPEC-SCNC: 9.8 MG/DL (ref 8.6–10.5)
CHLORIDE SERPL-SCNC: 103 MMOL/L (ref 98–107)
CHOLEST SERPL-MCNC: 165 MG/DL (ref 0–200)
CO2 SERPL-SCNC: 27.6 MMOL/L (ref 22–29)
CREAT SERPL-MCNC: 0.66 MG/DL (ref 0.57–1)
DEPRECATED RDW RBC AUTO: 40 FL (ref 37–54)
EGFRCR SERPLBLD CKD-EPI 2021: 111.8 ML/MIN/1.73
EOSINOPHIL # BLD AUTO: 0.15 10*3/MM3 (ref 0–0.4)
EOSINOPHIL NFR BLD AUTO: 1.1 % (ref 0.3–6.2)
ERYTHROCYTE [DISTWIDTH] IN BLOOD BY AUTOMATED COUNT: 13 % (ref 12.3–15.4)
GLUCOSE SERPL-MCNC: 95 MG/DL (ref 65–99)
HBA1C MFR BLD: 5.4 % (ref 3.5–5.6)
HCT VFR BLD AUTO: 34 % (ref 34–46.6)
HDLC SERPL-MCNC: 59 MG/DL (ref 40–60)
HGB BLD-MCNC: 11.2 G/DL (ref 12–15.9)
IMM GRANULOCYTES # BLD AUTO: 0.05 10*3/MM3 (ref 0–0.05)
IMM GRANULOCYTES NFR BLD AUTO: 0.4 % (ref 0–0.5)
LDLC SERPL CALC-MCNC: 81 MG/DL (ref 0–100)
LDLC/HDLC SERPL: 1.3 {RATIO}
LYMPHOCYTES # BLD AUTO: 3.44 10*3/MM3 (ref 0.7–3.1)
LYMPHOCYTES NFR BLD AUTO: 25.4 % (ref 19.6–45.3)
MCH RBC QN AUTO: 28 PG (ref 26.6–33)
MCHC RBC AUTO-ENTMCNC: 32.9 G/DL (ref 31.5–35.7)
MCV RBC AUTO: 85 FL (ref 79–97)
MONOCYTES # BLD AUTO: 0.64 10*3/MM3 (ref 0.1–0.9)
MONOCYTES NFR BLD AUTO: 4.7 % (ref 5–12)
NEUTROPHILS NFR BLD AUTO: 68.1 % (ref 42.7–76)
NEUTROPHILS NFR BLD AUTO: 9.22 10*3/MM3 (ref 1.7–7)
NRBC BLD AUTO-RTO: 0 /100 WBC (ref 0–0.2)
PLATELET # BLD AUTO: 368 10*3/MM3 (ref 140–450)
PMV BLD AUTO: 11.5 FL (ref 6–12)
POTASSIUM SERPL-SCNC: 4 MMOL/L (ref 3.5–5.2)
RBC # BLD AUTO: 4 10*6/MM3 (ref 3.77–5.28)
SODIUM SERPL-SCNC: 139 MMOL/L (ref 136–145)
TRIGL SERPL-MCNC: 146 MG/DL (ref 0–150)
VLDLC SERPL-MCNC: 25 MG/DL (ref 5–40)
WBC NRBC COR # BLD: 13.54 10*3/MM3 (ref 3.4–10.8)

## 2022-12-13 PROCEDURE — 36415 COLL VENOUS BLD VENIPUNCTURE: CPT

## 2022-12-13 PROCEDURE — 83036 HEMOGLOBIN GLYCOSYLATED A1C: CPT

## 2022-12-13 PROCEDURE — 85025 COMPLETE CBC W/AUTO DIFF WBC: CPT

## 2022-12-13 PROCEDURE — 80048 BASIC METABOLIC PNL TOTAL CA: CPT

## 2022-12-13 PROCEDURE — 80061 LIPID PANEL: CPT

## 2022-12-22 ENCOUNTER — TRANSCRIBE ORDERS (OUTPATIENT)
Dept: ADMINISTRATIVE | Facility: HOSPITAL | Age: 42
End: 2022-12-22

## 2022-12-22 DIAGNOSIS — R20.0 NUMBNESS OF HAND: Primary | ICD-10-CM

## 2023-01-18 ENCOUNTER — HOSPITAL ENCOUNTER (OUTPATIENT)
Dept: GENERAL RADIOLOGY | Facility: HOSPITAL | Age: 43
Discharge: HOME | End: 2023-01-18
Admitting: UROLOGY
Payer: MEDICAID

## 2023-01-18 ENCOUNTER — TRANSCRIBE ORDERS (OUTPATIENT)
Dept: ADMINISTRATIVE | Facility: HOSPITAL | Age: 43
End: 2023-01-18
Payer: MEDICAID

## 2023-01-18 DIAGNOSIS — N20.1 CALCULUS OF URETER: ICD-10-CM

## 2023-01-18 DIAGNOSIS — N20.0 CALCULUS OF KIDNEY: ICD-10-CM

## 2023-01-18 DIAGNOSIS — N20.0 CALCULUS OF KIDNEY: Primary | ICD-10-CM

## 2023-01-18 PROCEDURE — 74018 RADEX ABDOMEN 1 VIEW: CPT

## 2023-03-02 ENCOUNTER — HOSPITAL ENCOUNTER (OUTPATIENT)
Dept: GENERAL RADIOLOGY | Facility: HOSPITAL | Age: 43
Discharge: HOME | End: 2023-03-02
Admitting: UROLOGY
Payer: MEDICAID

## 2023-03-02 ENCOUNTER — TRANSCRIBE ORDERS (OUTPATIENT)
Dept: ADMINISTRATIVE | Facility: HOSPITAL | Age: 43
End: 2023-03-02
Payer: MEDICAID

## 2023-03-02 DIAGNOSIS — N20.0 CALCULUS, RENAL: ICD-10-CM

## 2023-03-02 DIAGNOSIS — N20.0 CALCULUS, RENAL: Primary | ICD-10-CM

## 2023-03-02 PROCEDURE — 74018 RADEX ABDOMEN 1 VIEW: CPT

## 2023-09-08 ENCOUNTER — HOSPITAL ENCOUNTER (OUTPATIENT)
Dept: NEUROLOGY | Facility: HOSPITAL | Age: 43
Discharge: HOME | End: 2023-09-08
Payer: MEDICAID

## 2023-09-08 DIAGNOSIS — R20.0 NUMBNESS OF HAND: ICD-10-CM

## 2023-09-08 PROCEDURE — 95910 NRV CNDJ TEST 7-8 STUDIES: CPT

## 2023-09-08 PROCEDURE — 95886 MUSC TEST DONE W/N TEST COMP: CPT

## 2023-09-26 ENCOUNTER — HOSPITAL ENCOUNTER (OUTPATIENT)
Dept: GENERAL RADIOLOGY | Facility: HOSPITAL | Age: 43
Discharge: HOME | End: 2023-09-26
Admitting: UROLOGY
Payer: MEDICAID

## 2023-09-26 ENCOUNTER — TRANSCRIBE ORDERS (OUTPATIENT)
Dept: ADMINISTRATIVE | Facility: HOSPITAL | Age: 43
End: 2023-09-26
Payer: MEDICAID

## 2023-09-26 DIAGNOSIS — N21.8 CALCULUS OF OTHER LOWER URINARY TRACT LOCATION: Primary | ICD-10-CM

## 2023-09-26 DIAGNOSIS — N21.8 CALCULUS OF OTHER LOWER URINARY TRACT LOCATION: ICD-10-CM

## 2023-09-26 PROCEDURE — 74018 RADEX ABDOMEN 1 VIEW: CPT

## 2023-10-10 NOTE — PROGRESS NOTES
Subjective: Vertigo and Migraine    Patient ID: Nataliya Wesley is a 43 y.o. female.    CHIEF COMPLAINT:Migraine headache vertigo    History of Present Illness  New patient referred by Brigtite VIEYRA for migraine headaches and vertigo  History of numerous kidney stones       Complaint: Vertigo  Onset:  last year   Quality: room is spinning/off balance  Severity: severe  Duration: has lasted for 24 hours before  Frequency: 1 time a week  Timing: anytime  Worsening factors: moving  Alleviating factors: sitting still  Associated Signs and symptoms:   nausea, see spots or eyes go blurry  Current meds: nothing  Past Medications: meclizine did not not work        Complaint: Migraines  Onset:1 year  Aura: Sparkles   Location: back or front of her head  Quality: throbbing, sharp   Severity: 8-9   Duration: over night or few hours  Frequency: every couple day, 15 days a month  Timing: anytime  Worsening factors:light, sound,   Alleviating factors: sleep  Associated Signs and symptoms:photophobia, phonophobia, nausea, vomiting  Current meds: tylenol, ibuprofen    Prior history of severe kidney stones.      The following portions of the patient's history were reviewed and updated as appropriate: allergies, current medications, past family history, past medical history, past social history, past surgical history and problem list.      Family History   Problem Relation Age of Onset    Hypertension Mother        Past Medical History:   Diagnosis Date    Hypertension        Social History     Socioeconomic History    Marital status:    Tobacco Use    Smoking status: Never    Smokeless tobacco: Never   Substance and Sexual Activity    Alcohol use: Not Currently     Comment: once a year     Drug use: Never         Current Outpatient Medications:     buPROPion XL (WELLBUTRIN XL) 150 MG 24 hr tablet, 1 tablet in the morning Orally Once a day, Disp: , Rfl:     FLUoxetine (PROzac) 40 MG capsule, Take 1 capsule by mouth  Daily., Disp: , Rfl:     loratadine (CLARITIN) 10 MG tablet, , Disp: , Rfl:     METFORMIN HCL PO, Take  by mouth., Disp: , Rfl:     metoprolol succinate XL (TOPROL-XL) 25 MG 24 hr tablet, Take 1 tablet by mouth once daily, Disp: 30 tablet, Rfl: 0    metoprolol tartrate (LOPRESSOR) 25 MG tablet, Every 12 (Twelve) Hours., Disp: , Rfl:     sertraline (ZOLOFT) 100 MG tablet, 1 tablet Daily., Disp: , Rfl:     triamterene-hydrochlorothiazide (MAXZIDE-25) 37.5-25 MG per tablet, Take 1 tablet by mouth Daily., Disp: , Rfl:     diazePAM (Valium) 5 MG tablet, Take 1 in parking lot and bring other to MRI suite., Disp: 2 tablet, Rfl: 0    gabapentin (NEURONTIN) 300 MG capsule, Wk 1: take 1 tab at bedtime, Week2: take 1 tab bid, Wk3: take 1 tab tid., Disp: 90 capsule, Rfl: 5    SUMAtriptan (IMITREX) 100 MG tablet, Take 1 tab at onset of Migraine. May repeat dose one time after 2 hours if Migraine not stopped.  Max 2/24 or 3 days a week., Disp: 30 tablet, Rfl: 2    Review of Systems   Constitutional:  Positive for fatigue.   Respiratory:  Positive for cough.    Cardiovascular:  Positive for leg swelling.   Gastrointestinal:  Positive for nausea.   Musculoskeletal:  Positive for back pain.   Neurological:  Positive for dizziness, light-headedness and headaches.   All other systems reviewed and are negative.       I have reviewed ROS completed by medical assistant.     Objective:    Neurologic Exam     Mental Status   Oriented to person, place, and time.   Speech: speech is normal     Cranial Nerves   Cranial nerves II through XII intact.     CN III, IV, VI   Pupils are equal, round, and reactive to light.    Gait, Coordination, and Reflexes     Gait  Gait: normal    Coordination   Finger to nose coordination: normal  Tandem walking coordination: normal    Reflexes   Right brachioradialis: 2+  Left brachioradialis: 2+  Right biceps: 2+  Left biceps: 2+  Right triceps: 2+  Right patellar: 2+  Left patellar: 2+  Right achilles:  2+  Left achilles: 2+      Physical Exam  Vitals reviewed.   Constitutional:       Appearance: Normal appearance. She is obese.   HENT:      Head: Normocephalic and atraumatic.      Right Ear: Hearing and tympanic membrane normal.      Left Ear: Hearing and tympanic membrane normal.      Nose: Nose normal.      Mouth/Throat:      Lips: Pink.      Mouth: Mucous membranes are moist.   Eyes:      General: Lids are normal. No visual field deficit.     Extraocular Movements: Extraocular movements intact.      Pupils: Pupils are equal, round, and reactive to light.   Neck:     Cardiovascular:      Rate and Rhythm: Normal rate and regular rhythm.      Pulses: Normal pulses.      Heart sounds: Normal heart sounds, S1 normal and S2 normal.   Pulmonary:      Effort: Pulmonary effort is normal.      Breath sounds: Normal breath sounds.   Abdominal:      General: Abdomen is flat.   Musculoskeletal:         General: Normal range of motion.      Cervical back: Normal range of motion.   Skin:     General: Skin is warm and dry.   Neurological:      General: No focal deficit present.      Mental Status: She is alert and oriented to person, place, and time.      Cranial Nerves: Cranial nerves 2-12 are intact. No cranial nerve deficit, dysarthria or facial asymmetry.      Sensory: Sensation is intact.      Motor: Motor function is intact. No weakness, tremor, atrophy, abnormal muscle tone, seizure activity or pronator drift.      Coordination: Coordination is intact. Romberg sign negative. Coordination normal. Finger-Nose-Finger Test and Heel to Shin Test normal. Rapid alternating movements normal.      Gait: Gait is intact. Gait and tandem walk normal.      Deep Tendon Reflexes: Babinski sign absent on the right side. Babinski sign absent on the left side.      Reflex Scores:       Tricep reflexes are 2+ on the right side.       Bicep reflexes are 2+ on the right side and 2+ on the left side.       Brachioradialis reflexes are 2+ on  the right side and 2+ on the left side.       Patellar reflexes are 2+ on the right side and 2+ on the left side.       Achilles reflexes are 2+ on the right side and 2+ on the left side.  Psychiatric:         Attention and Perception: Attention normal.         Mood and Affect: Mood normal.         Speech: Speech normal.         Behavior: Behavior normal.         Cognition and Memory: Cognition normal.       Assessment/Plan:  The patient will be sent for an MRI of the brain as she has had a change in migraine, be prescribed sumatriptan for rescue and gabapentin as a preventative.  In regard to the vertigo she will be sent to vestibular rehab.  The patient has a marked history of kidney stones and Topamax is prohibited.  She is to download a migraine calendar and keep track of it on her phone.  She is to contact the clinic if her migraines do not respond to sumatriptan.    Diagnoses and all orders for this visit:    1. Vertigo (Primary)  -     Ambulatory Referral to Physical Therapy Evaluate and treat, Vestibular    2. Migraine with aura and without status migrainosus, not intractable  -     gabapentin (NEURONTIN) 300 MG capsule; Wk 1: take 1 tab at bedtime, Week2: take 1 tab bid, Wk3: take 1 tab tid.  Dispense: 90 capsule; Refill: 5  -     SUMAtriptan (IMITREX) 100 MG tablet; Take 1 tab at onset of Migraine. May repeat dose one time after 2 hours if Migraine not stopped.  Max 2/24 or 3 days a week.  Dispense: 30 tablet; Refill: 2  -     MRI Brain Without Contrast; Future  -     diazePAM (Valium) 5 MG tablet; Take 1 in parking lot and bring other to MRI suite.  Dispense: 2 tablet; Refill: 0    3. Cervicalgia    No action needed at this time per the patient.    Return in about 4 months (around 2/23/2024) for Next scheduled follow up Aurelia Rogers .    I spent 50 minutes caring for Nataliya on this date of service. This time includes time spent by me in the following activities: reviewing tests, obtaining and/or  reviewing a separately obtained history, performing a medically appropriate examination and/or evaluation, counseling and educating the patient/family/caregiver, ordering medications, tests, or procedures, and documenting information in the medical record.      This document has been electronically signed by Aurelia LESTER on October 23, 2023 17:38 EDT

## 2023-10-23 ENCOUNTER — OFFICE VISIT (OUTPATIENT)
Dept: NEUROLOGY | Facility: CLINIC | Age: 43
End: 2023-10-23
Payer: MEDICAID

## 2023-10-23 VITALS
DIASTOLIC BLOOD PRESSURE: 83 MMHG | BODY MASS INDEX: 47.01 KG/M2 | HEIGHT: 61 IN | HEART RATE: 71 BPM | WEIGHT: 249 LBS | SYSTOLIC BLOOD PRESSURE: 144 MMHG

## 2023-10-23 DIAGNOSIS — M54.2 CERVICALGIA: ICD-10-CM

## 2023-10-23 DIAGNOSIS — R42 VERTIGO: Primary | ICD-10-CM

## 2023-10-23 DIAGNOSIS — G43.109 MIGRAINE WITH AURA AND WITHOUT STATUS MIGRAINOSUS, NOT INTRACTABLE: ICD-10-CM

## 2023-10-23 PROCEDURE — 3077F SYST BP >= 140 MM HG: CPT | Performed by: NURSE PRACTITIONER

## 2023-10-23 PROCEDURE — 1159F MED LIST DOCD IN RCRD: CPT | Performed by: NURSE PRACTITIONER

## 2023-10-23 PROCEDURE — 99214 OFFICE O/P EST MOD 30 MIN: CPT | Performed by: NURSE PRACTITIONER

## 2023-10-23 PROCEDURE — 3079F DIAST BP 80-89 MM HG: CPT | Performed by: NURSE PRACTITIONER

## 2023-10-23 PROCEDURE — 1160F RVW MEDS BY RX/DR IN RCRD: CPT | Performed by: NURSE PRACTITIONER

## 2023-10-23 RX ORDER — DIAZEPAM 5 MG/1
TABLET ORAL
Qty: 2 TABLET | Refills: 0 | Status: SHIPPED | OUTPATIENT
Start: 2023-10-23

## 2023-10-23 RX ORDER — SUMATRIPTAN 100 MG/1
TABLET, FILM COATED ORAL
Qty: 30 TABLET | Refills: 2 | Status: SHIPPED | OUTPATIENT
Start: 2023-10-23

## 2023-10-23 RX ORDER — BUPROPION HYDROCHLORIDE 150 MG/1
TABLET ORAL
COMMUNITY

## 2023-10-23 RX ORDER — SERTRALINE HYDROCHLORIDE 100 MG/1
100 TABLET, FILM COATED ORAL EVERY 24 HOURS
COMMUNITY

## 2023-10-23 RX ORDER — GABAPENTIN 300 MG/1
CAPSULE ORAL
Qty: 90 CAPSULE | Refills: 5 | Status: SHIPPED | OUTPATIENT
Start: 2023-10-23

## 2023-10-25 ENCOUNTER — PATIENT ROUNDING (BHMG ONLY) (OUTPATIENT)
Dept: NEUROLOGY | Facility: CLINIC | Age: 43
End: 2023-10-25
Payer: MEDICAID

## 2023-11-10 ENCOUNTER — TREATMENT (OUTPATIENT)
Dept: PHYSICAL THERAPY | Facility: CLINIC | Age: 43
End: 2023-11-10
Payer: MEDICAID

## 2023-11-10 DIAGNOSIS — R42 VERTIGO: Primary | ICD-10-CM

## 2023-11-10 PROCEDURE — 97161 PT EVAL LOW COMPLEX 20 MIN: CPT | Performed by: PHYSICAL THERAPIST

## 2023-11-10 PROCEDURE — 95992 CANALITH REPOSITIONING PROC: CPT | Performed by: PHYSICAL THERAPIST

## 2023-11-10 NOTE — PROGRESS NOTES
Physical Therapy Initial Evaluation and Plan of Care    Patient: Nataliya Wesley   : 1980  Diagnosis/ICD-10 Code:  Vertigo [R42]  Referring practitioner: Aurelia Rogers, *  Date of Initial Visit: 11/10/2023  Today's Date: 11/10/2023  Patient seen for 1 sessions           Subjective Questionnaire: DHI: 40%      Subjective Evaluation    History of Present Illness  Mechanism of injury: Pt is referred to therapy due to dizziness. Reports it has been going on for several months or even longer.. 1st episode was due to her high BP.  It got better after she went on BP medicine but it came back and hasn't gone away.   She also has hx of migraine HA. She was put on a medication which has helped her HA but not the dizziness.  She has n/v with the HA and dizziness. She feels like she is dizzy all the time even when she is not moving but her symptoms get worse with movement.  She also has neck pain with radiating pain to R Uts.     Onset of symptoms : several months ago    Aggravating factors: riding in a car, rolling over in bed, bending over, looking up , supine to sit, sit to stand, moving her head quickly    Relieving factors: closing her eyes and not move    Functional limitation: playing with grand kids, house work, doing her job ( she cleans houses )    PMH: HTN, anxiety, depression        Quality of life: fair    Social Support  Lives with: spouse    Patient Goals  Patient goal: resolve the dizziness         Precautions: none    Objective          Functional Assessment     Comments  Additional subjective information:   Vestibular testing completed:  no   Vision problems/correction: wears glasses   Hearing problems: no   History of falls: no      Symptoms last a matter of:  few mins sometimes longer   Symptoms feel like:  spinning, swimmy, off balance   Concurrent symptoms:  HA; n/v; blurry vision; aural fullness;      Objective:     Oculomotor and VOR:  deferred    Spontaneous nystagmus:    Gaze-evoked  nystagmus:    Skew deviation:    Head-shake nystagmus:    Smooth pursuit:    Saccades:    VORc:    Head thrust:  R/L        SVA:      DVA:  Horiz:              Vert:        Cervical AROM:  wfl, increase symptoms with head movement in all directions     Vertebral artery screen: neg     Cerebellar function:  UE:  finger to nose: deferred                                                       IRISH:                                     LE:  IRISH:                                                       Heel to shin:     BPPV Assessment:    Roll Test:  Right: pos for subjective co, no nystagmus, symptoms lasting for about 10' , increase symptoms upon sitting up            Left: neg,  increase symptoms upon sitting up from testing position      Ge Hallpike:  Right: pos for subjective co, immediate onset, no nystagmus, symptoms lasting for about 10-15' , increase symptoms     upon sitting up from testing position                          Left: pos for subjective co, no nystagmus, less intense than  R side lasting shorter duration     MCTSIB:  cond 1: deferred   `                           cond 2:                              cond 3:                              cond 4:     Gait: normal    Treatment :  pt received R Epley maneuver f/b post rx precautions for 24 hrs.  Pt verbalized understanding of home instructions and felt better right after today's rx.                Assessment & Plan       Assessment  Impairments: abnormal or restricted ROM and activity intolerance   Assessment details: Pt is a 43 y.o. female referred to therapy due to vertigo. Pt presents with sign/ symptoms of BPPV. Today R DHP was positive.     Upon initial evaluation pt exhibits the above impairments and functional limitations. 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   Pt is a good candidate for rehab and will benefit from skilled physical therapy to address impairments, resolve  dizziness and maximize function.    Prognosis: good    Goals  Plan Goals: STGs: in 4 weeks    1- Pt will repot at least 50 % improvement and symptom reduction   2- Pt will be independent with initial  HEP if indicated  3- Assess balance and initiate balance activities if indicated      LTGs: By DC     1- Pt will report 100 % improvement and symptom reduction  2- Pt will be independent with self management of her condition   3- Pt will have improved DHI score indicating improved function and symptoms reduction  4- Pt will be have neg DHP and Roll test Bilateraly    Plan  Therapy options: will be seen for skilled therapy services  Planned therapy interventions: functional ROM exercises, home exercise program, manual therapy, neuromuscular re-education and therapeutic activities  Other planned therapy interventions: CRM  Frequency: 1x week  Duration in weeks: 12  Treatment plan discussed with: patient        See flow sheet for treatment detail    History # of Personal Factors and/or Comorbidities: LOW (0)  Examination of Body System(s): # of elements: LOW (1-2)  Clinical Presentation: STABLE   Clinical Decision Making: LOW           Timed:         Manual Therapy:         mins  23175;     Therapeutic Exercise:         mins  63186;     Neuromuscular Rosangela:        mins  56714;    Therapeutic Activity:          mins  00338;     Gait Training:           mins  81944;     Ultrasound:          mins  51242;    Ionto                                   mins   28728  Self Care                            mins   71401        Un-Timed:  Electrical Stimulation:         mins  00994 ( );  Dry Needling          mins self-pay  Traction          mins 46753  Canal repositioning      15     mins    22901  Low Eval     35     Mins  51375  Mod Eval          Mins  98135  High Eval                            Mins  72757  Re-Eval                               mins  04071        Timed Treatment:      mins   Total Treatment:     50    mins    PT SIGNATURE: Jase Mendiola PT, CLT  [place] License: # [id number]     DATE TREATMENT INITIATED: 11/10/2023    Initial Certification  Certification Period: 11/10/2023 thru 2/7/2024  I certify that the therapy services are furnished while this patient is under my care.  The services outlined above are required by this patient, and will be reviewed every 90 days.     PHYSICIAN: Aurelia Rogers, APRN   NPI: [id number]                                      DATE:     Please sign and return via fax to [phone].. Thank you, [facility].

## 2023-11-17 ENCOUNTER — TREATMENT (OUTPATIENT)
Dept: PHYSICAL THERAPY | Facility: CLINIC | Age: 43
End: 2023-11-17
Payer: MEDICAID

## 2023-11-17 DIAGNOSIS — R42 VERTIGO: Primary | ICD-10-CM

## 2023-11-17 NOTE — PROGRESS NOTES
Physical Therapy Daily Treatment Note    [facility], [address]  [place] [address] [phone]    Patient: Nataliya Wesley  : 1980  Referring practitioner: Aurelia Rogers, *  Diagnosis/ ICD10 code: Vertigo [R42]  Today's Date: 2023    VISIT#: 2    Subjective   Pt reports: felt better for a couple of days after last rx but it gradually came back. It is not as bad but still feels it every once in a while. Was playing cards last night and had to stop and close her eyes a few times since it was making her dizzy.       Objective      Kingsley Hallpike:  Right: pos for subjective co, immediate onset, no nystagmus, symptoms lasting for about 5-10 secs, no spinning, just lightheaded.     Left: neg , but increase symptoms upon sitting up from testing position    Treatment :  R Epley maneuver f/b post rx precautions for 24 hrs.  Pt verbalized understanding of home instructions and felt better right after today's rx.          Patient Education:    Assessment & Plan       Assessment  Assessment details: Pt seems to be responding to therapy. Her symptoms were less intense today and L DHP was neg. R side was still pos but not as intense and shorter duration.            Progress per Plan of Care            Timed:         Manual Therapy:         mins  27646;     Therapeutic Exercise:         mins  29632;     Neuromuscular Rosangela:   10     mins  98863;    Therapeutic Activity:          mins  03416;     Gait Training:           mins  15072;     Ultrasound:          mins  19421;    Ionto                                   mins   26009  Self Care                            mins   92471      Un-Timed:  Electrical Stimulation:         mins  44120 ( );  Traction          mins 78321  Canal repositioning       13    mins    24208        Timed Treatment:   10   mins   Total Treatment:     23   mins    Jase Mendiola, PT, CLT  Physical Therapist  [place] License:  # [id number]

## 2023-12-01 ENCOUNTER — TREATMENT (OUTPATIENT)
Dept: PHYSICAL THERAPY | Facility: CLINIC | Age: 43
End: 2023-12-01
Payer: MEDICAID

## 2023-12-01 DIAGNOSIS — R42 VERTIGO: Primary | ICD-10-CM

## 2023-12-01 NOTE — PROGRESS NOTES
Physical Therapy Daily Treatment Note    [facility], [address]  [place] [address]  [phone]    Patient: Nataliya Wesley  : 1980  Referring practitioner: Aurelia Rogers, *  Diagnosis/ ICD10 code: Vertigo [R42]  Today's Date: 2023    VISIT#: 3    Subjective   Pt reports: she felt better for a few days after last rx but she has had some dizziness off and on but today has been a bad day and has been dizzy since she got up this morning. Her R ear has been hurting too. Had her  drive her today since she felt so bad.       Objective     Ge Hallpike:  Right: pos for subjective co, immediate onset, spinning,  mild rotational nystagmus, symptoms lasting for about 10-15 secs     Left: neg , but increase symptoms upon sitting up from testing position     Treatment :  R Epley maneuver f/b post rx precautions for 24 hrs.  Pt verbalized understanding of home instructions and felt much better right after today's rx.         Patient Education:     Assessment & Plan       Assessment  Assessment details: Seems to be better for a few days after ea treatment but symptoms gradually return.  Felt better today right after Epley maneuver. Presents with sign/ symptoms of BPPV.           Progress per Plan of Care            Timed:         Manual Therapy:         mins  61515;     Therapeutic Exercise:         mins  22079;     Neuromuscular Rosangela:  11      mins  21010;    Therapeutic Activity:          mins  82478;     Gait Training:           mins  14603;     Ultrasound:          mins  03063;    Ionto                                   mins   53049  Self Care                            mins   54565      Un-Timed:  Electrical Stimulation:         mins  12630 ( );  Traction          mins 70707  Canal repositioning       13    mins    37667        Timed Treatment:  24    mins   Total Treatment:    24    mins    Jase Mendiola PT, CLT  Physical Therapist  [place] License:  # [id number]

## 2023-12-15 ENCOUNTER — TREATMENT (OUTPATIENT)
Dept: PHYSICAL THERAPY | Facility: CLINIC | Age: 43
End: 2023-12-15
Payer: MEDICAID

## 2023-12-15 DIAGNOSIS — R42 VERTIGO: Primary | ICD-10-CM

## 2023-12-15 NOTE — PROGRESS NOTES
Physical Therapy Daily Treatment Note    [facility], [address]  [place] [address]  [phone]    Patient: Nataliya Wesley  : 1980  Referring practitioner: Aurelia Rogers, *  Diagnosis/ ICD10 code: Vertigo [R42]  Today's Date: 12/15/2023    VISIT#: 4    Subjective   Pt reports: doing better but still has episodes of dizziness. Doesn't happen as often but still does playing card, cooking and sometimes just sitting.       Objective       Ge Hallpike:  Right: no nystagmus, mild co dizziness for just a few secs, inc symptoms upon sitting up from testing position , no spinning     Left: pos for spinning immediate onset lasting for a few secs, symptoms more intense than R side today.  No nystagmus noted.      Treatment :  L Epley maneuver f/b post rx precautions for 24 hrs.  Pt verbalized understanding of home instructions and felt better after today's rx.       Patient Education:    Assessment & Plan       Assessment  Assessment details: Pt presents today with positive L DHP today.  R side seems to have been cleared.  Seems to have B BPPV.            Progress per Plan of Care            Timed:         Manual Therapy:         mins  73023;     Therapeutic Exercise:         mins  00284;     Neuromuscular Rosangela:  15      mins  26661;    Therapeutic Activity:          mins  44702;     Gait Training:           mins  58322;     Ultrasound:          mins  37727;    Ionto                                   mins   13018  Self Care                            mins   67017      Un-Timed:  Electrical Stimulation:         mins  83714 ( );  Traction          mins 24243  Canal repositioning      12     mins    93971        Timed Treatment:  27    mins   Total Treatment:    27    mins    Jase Mendiola PT, CLT  Physical Therapist  [place] License:  # [id number]

## 2023-12-18 ENCOUNTER — TELEPHONE (OUTPATIENT)
Dept: NEUROLOGY | Facility: CLINIC | Age: 43
End: 2023-12-18
Payer: MEDICAID

## 2023-12-18 NOTE — TELEPHONE ENCOUNTER
----- Message from JARRELL Carroll sent at 12/18/2023  8:57 AM EST -----  Please phone the patient and notify them that the results are normal.  Notify the patient that the MRI did not show any acute abnormalities.    There were a few scattered periventricular and subcortical hyperintensities that were thought to be there due to migraine or other disorders.  She did have a partial empty sella which is the bony structure that houses the pituitary gland sometimes if it is not completely filled with cerebral spinal fluid that is associated with intracranial hypertension.  This was noted to be a small area.        MRI of the brain without contrast completed 12/15/2023 at [facility] hyperintensities that are thought to be chronic and related to migraine.  No signs of intracranial hypertension.    Compared to CT of the head 10/25/2009  Impression: No acute intracranial abnormality.  A few scattered periventricular and subcortical T2/FLAIR hyperintensities are nonspecific.  These may suggest chronic migraines.  Alternately this may represent posttraumatic postinfectious postinflammatory or post demyelinating process.  No evidence of active disease.  Incidentally noted partially empty sella.  No other findings to suggest intracranial hypertension.  Please correlate clinically.  Signed by Oneal Means DO

## 2023-12-20 ENCOUNTER — TELEPHONE (OUTPATIENT)
Dept: NEUROLOGY | Facility: CLINIC | Age: 43
End: 2023-12-20

## 2023-12-20 NOTE — TELEPHONE ENCOUNTER
Caller: PROMISE    Relationship:     Best call back number: [phone]     What is the best time to reach you: ANY    Who are you requesting to speak with (clinical staff, provider,  specific staff member): STAFF    What was the call regarding: PLEASE REFAX REFERRAL & MED RECS SENT TO [facility] VIA FAX ON 12/18/23 AS THE FAX DID NOT COME THROUGH COMPLETELY.    PLEASE FAX [phone] OR CALL TO ADVISE-THANK YOU

## 2024-01-05 ENCOUNTER — TREATMENT (OUTPATIENT)
Dept: PHYSICAL THERAPY | Facility: CLINIC | Age: 44
End: 2024-01-05
Payer: MEDICAID

## 2024-01-05 DIAGNOSIS — R42 VERTIGO: Primary | ICD-10-CM

## 2024-01-05 PROCEDURE — 97112 NEUROMUSCULAR REEDUCATION: CPT | Performed by: PHYSICAL THERAPIST

## 2024-02-17 ENCOUNTER — APPOINTMENT (OUTPATIENT)
Dept: GENERAL RADIOLOGY | Facility: HOSPITAL | Age: 44
End: 2024-02-17
Payer: MEDICAID

## 2024-02-17 ENCOUNTER — HOSPITAL ENCOUNTER (EMERGENCY)
Facility: HOSPITAL | Age: 44
Discharge: HOME | End: 2024-02-17
Attending: EMERGENCY MEDICINE
Payer: MEDICAID

## 2024-02-17 ENCOUNTER — APPOINTMENT (OUTPATIENT)
Dept: CT IMAGING | Facility: HOSPITAL | Age: 44
End: 2024-02-17
Payer: MEDICAID

## 2024-02-17 VITALS
HEART RATE: 77 BPM | TEMPERATURE: 98.1 F | WEIGHT: 261.91 LBS | OXYGEN SATURATION: 98 % | DIASTOLIC BLOOD PRESSURE: 90 MMHG | BODY MASS INDEX: 49.45 KG/M2 | HEIGHT: 61 IN | SYSTOLIC BLOOD PRESSURE: 163 MMHG | RESPIRATION RATE: 18 BRPM

## 2024-02-17 DIAGNOSIS — R51.9 ACUTE NONINTRACTABLE HEADACHE, UNSPECIFIED HEADACHE TYPE: ICD-10-CM

## 2024-02-17 DIAGNOSIS — R07.81 PLEURITIC CHEST PAIN: Primary | ICD-10-CM

## 2024-02-17 LAB
ALBUMIN SERPL-MCNC: 3.9 G/DL (ref 3.5–5.2)
ALBUMIN/GLOB SERPL: 1.3 G/DL
ALP SERPL-CCNC: 92 U/L (ref 39–117)
ALT SERPL W P-5'-P-CCNC: 8 U/L (ref 1–33)
ANION GAP SERPL CALCULATED.3IONS-SCNC: 9 MMOL/L (ref 5–15)
APTT PPP: 26.5 SECONDS (ref 61–76.5)
AST SERPL-CCNC: 9 U/L (ref 1–32)
BASOPHILS # BLD AUTO: 0 10*3/MM3 (ref 0–0.2)
BASOPHILS NFR BLD AUTO: 0.4 % (ref 0–1.5)
BILIRUB SERPL-MCNC: <0.2 MG/DL (ref 0–1.2)
BUN SERPL-MCNC: 13 MG/DL (ref 6–20)
BUN/CREAT SERPL: 20.6 (ref 7–25)
CALCIUM SPEC-SCNC: 8.9 MG/DL (ref 8.6–10.5)
CHLORIDE SERPL-SCNC: 105 MMOL/L (ref 98–107)
CO2 SERPL-SCNC: 27 MMOL/L (ref 22–29)
CREAT SERPL-MCNC: 0.63 MG/DL (ref 0.57–1)
D DIMER PPP FEU-MCNC: 0.27 MG/L (FEU) (ref 0–0.5)
DEPRECATED RDW RBC AUTO: 40.7 FL (ref 37–54)
EGFRCR SERPLBLD CKD-EPI 2021: 112.3 ML/MIN/1.73
EOSINOPHIL # BLD AUTO: 0.2 10*3/MM3 (ref 0–0.4)
EOSINOPHIL NFR BLD AUTO: 1.6 % (ref 0.3–6.2)
ERYTHROCYTE [DISTWIDTH] IN BLOOD BY AUTOMATED COUNT: 13.7 % (ref 12.3–15.4)
GEN 5 2HR TROPONIN T REFLEX: 7 NG/L
GLOBULIN UR ELPH-MCNC: 3.1 GM/DL
GLUCOSE SERPL-MCNC: 100 MG/DL (ref 65–99)
HCT VFR BLD AUTO: 36.4 % (ref 34–46.6)
HGB BLD-MCNC: 11.7 G/DL (ref 12–15.9)
INR PPP: <0.93 (ref 0.93–1.1)
LYMPHOCYTES # BLD AUTO: 2.9 10*3/MM3 (ref 0.7–3.1)
LYMPHOCYTES NFR BLD AUTO: 22.3 % (ref 19.6–45.3)
MCH RBC QN AUTO: 27.6 PG (ref 26.6–33)
MCHC RBC AUTO-ENTMCNC: 32.1 G/DL (ref 31.5–35.7)
MCV RBC AUTO: 85.9 FL (ref 79–97)
MONOCYTES # BLD AUTO: 0.7 10*3/MM3 (ref 0.1–0.9)
MONOCYTES NFR BLD AUTO: 5.3 % (ref 5–12)
NEUTROPHILS NFR BLD AUTO: 70.4 % (ref 42.7–76)
NEUTROPHILS NFR BLD AUTO: 9.3 10*3/MM3 (ref 1.7–7)
NRBC BLD AUTO-RTO: 0 /100 WBC (ref 0–0.2)
PLATELET # BLD AUTO: 331 10*3/MM3 (ref 140–450)
PMV BLD AUTO: 8.8 FL (ref 6–12)
POTASSIUM SERPL-SCNC: 3.8 MMOL/L (ref 3.5–5.2)
PROT SERPL-MCNC: 7 G/DL (ref 6–8.5)
PROTHROMBIN TIME: 9.8 SECONDS (ref 9.6–11.7)
QT INTERVAL: 412 MS
QTC INTERVAL: 437 MS
RBC # BLD AUTO: 4.23 10*6/MM3 (ref 3.77–5.28)
SODIUM SERPL-SCNC: 141 MMOL/L (ref 136–145)
TROPONIN T DELTA: 0 NG/L
TROPONIN T SERPL HS-MCNC: 7 NG/L
WBC NRBC COR # BLD AUTO: 13.1 10*3/MM3 (ref 3.4–10.8)

## 2024-02-17 PROCEDURE — 71045 X-RAY EXAM CHEST 1 VIEW: CPT

## 2024-02-17 PROCEDURE — 93005 ELECTROCARDIOGRAM TRACING: CPT | Performed by: EMERGENCY MEDICINE

## 2024-02-17 PROCEDURE — 85730 THROMBOPLASTIN TIME PARTIAL: CPT | Performed by: EMERGENCY MEDICINE

## 2024-02-17 PROCEDURE — 25010000002 PROCHLORPERAZINE 10 MG/2ML SOLUTION: Performed by: EMERGENCY MEDICINE

## 2024-02-17 PROCEDURE — 80053 COMPREHEN METABOLIC PANEL: CPT | Performed by: EMERGENCY MEDICINE

## 2024-02-17 PROCEDURE — 36415 COLL VENOUS BLD VENIPUNCTURE: CPT

## 2024-02-17 PROCEDURE — 25010000002 DIPHENHYDRAMINE PER 50 MG: Performed by: EMERGENCY MEDICINE

## 2024-02-17 PROCEDURE — 85379 FIBRIN DEGRADATION QUANT: CPT | Performed by: EMERGENCY MEDICINE

## 2024-02-17 PROCEDURE — 70450 CT HEAD/BRAIN W/O DYE: CPT

## 2024-02-17 PROCEDURE — 85610 PROTHROMBIN TIME: CPT | Performed by: EMERGENCY MEDICINE

## 2024-02-17 PROCEDURE — 99284 EMERGENCY DEPT VISIT MOD MDM: CPT

## 2024-02-17 PROCEDURE — 96374 THER/PROPH/DIAG INJ IV PUSH: CPT

## 2024-02-17 PROCEDURE — 84484 ASSAY OF TROPONIN QUANT: CPT | Performed by: EMERGENCY MEDICINE

## 2024-02-17 PROCEDURE — 96375 TX/PRO/DX INJ NEW DRUG ADDON: CPT

## 2024-02-17 PROCEDURE — 85025 COMPLETE CBC W/AUTO DIFF WBC: CPT | Performed by: EMERGENCY MEDICINE

## 2024-02-17 RX ORDER — DIPHENHYDRAMINE HYDROCHLORIDE 50 MG/ML
25 INJECTION INTRAMUSCULAR; INTRAVENOUS ONCE
Status: COMPLETED | OUTPATIENT
Start: 2024-02-17 | End: 2024-02-17

## 2024-02-17 RX ORDER — PROCHLORPERAZINE EDISYLATE 5 MG/ML
10 INJECTION INTRAMUSCULAR; INTRAVENOUS ONCE
Status: COMPLETED | OUTPATIENT
Start: 2024-02-17 | End: 2024-02-17

## 2024-02-17 RX ORDER — NAPROXEN 500 MG/1
500 TABLET ORAL 2 TIMES DAILY WITH MEALS
Qty: 10 TABLET | Refills: 0 | Status: SHIPPED | OUTPATIENT
Start: 2024-02-17 | End: 2024-02-22

## 2024-02-17 RX ADMIN — DIPHENHYDRAMINE HYDROCHLORIDE 25 MG: 50 INJECTION, SOLUTION INTRAMUSCULAR; INTRAVENOUS at 06:29

## 2024-02-17 RX ADMIN — PROCHLORPERAZINE EDISYLATE 10 MG: 5 INJECTION INTRAMUSCULAR; INTRAVENOUS at 06:31

## 2024-02-17 NOTE — ED NOTES
Patient asleep with family at bedside, does not appear to be in pain.   [facility]

## 2024-02-17 NOTE — ED PROVIDER NOTES
Subjective   History of Present Illness  43-year-old female presents with elevated blood pressure with headache and chest discomfort.  Patient has been ill over the past 6 days with cough and congestion.  Patient had taken some over-the-counter cold preparations and was concerned that the decongestants may have raised her blood pressure.  Patient has a gradual onset of frontal headache yesterday which got worse this morning with elevated blood pressure.  Patient also had substernal pleuritic chest pain as well.  Patient also feels lightheaded and nauseous.  Patient does have a history of migraines.  Patient is a non-smoker without any family history of premature coronary disease.  No calf pain or swelling, no recent trips or travel, no history of DVT or PE.      Review of Systems   HENT:  Positive for congestion.    Respiratory:  Positive for cough.    Cardiovascular:  Positive for chest pain.   Gastrointestinal:  Positive for nausea.   Neurological:  Positive for light-headedness and headaches.       Past Medical History:   Diagnosis Date    Hypertension        Allergies   Allergen Reactions    Lisinopril Unknown - High Severity    Topamax [Topiramate] Other (See Comments)     Marked prior history of kidney stones.  Topamax is contraindicated due to possibility of causing kidney stones.       Past Surgical History:   Procedure Laterality Date    HYSTERECTOMY  10/20/2020       Family History   Problem Relation Age of Onset    Hypertension Mother        Social History     Socioeconomic History    Marital status:    Tobacco Use    Smoking status: Never    Smokeless tobacco: Never   Substance and Sexual Activity    Alcohol use: Not Currently     Comment: once a year     Drug use: Never           Objective   Physical Exam  Constitutional:       General: She is not in acute distress.  HENT:      Head: Normocephalic and atraumatic.      Mouth/Throat:      Mouth: Mucous membranes are moist.      Pharynx: Oropharynx  is clear.   Eyes:      Extraocular Movements: Extraocular movements intact.      Conjunctiva/sclera: Conjunctivae normal.      Pupils: Pupils are equal, round, and reactive to light.   Cardiovascular:      Rate and Rhythm: Normal rate and regular rhythm.      Heart sounds: Normal heart sounds.   Pulmonary:      Effort: Pulmonary effort is normal.      Breath sounds: Normal breath sounds.   Abdominal:      General: Bowel sounds are normal. There is no distension.      Palpations: Abdomen is soft.      Tenderness: There is no abdominal tenderness.   Musculoskeletal:         General: No swelling or tenderness.      Cervical back: Normal range of motion and neck supple. No rigidity.   Skin:     General: Skin is warm and dry.      Capillary Refill: Capillary refill takes less than 2 seconds.   Neurological:      Mental Status: She is alert and oriented to person, place, and time.      Cranial Nerves: No cranial nerve deficit.      Sensory: No sensory deficit.      Motor: No weakness.   Psychiatric:         Mood and Affect: Mood normal.         Behavior: Behavior normal.         Procedures           ED Course  ED Course as of 02/17/24 1643   Sat Feb 17, 2024   0726 Assumed care from Dr. De Paz pending lab results, reeval and disposition. [MD]   0896 On examination, patient resting quietly no distress.  He states he is feeling much better.  She has no new complaints.  She is well-appearing and hemodynamically stable.  She is neurologically intact and ambulatory without difficulty.  No chest pain currently.    Findings were discussed with her.  She will be discharged home instructed to follow-up with her primary care provider but was given warning signs for prompt return and voiced understanding. [MD]      ED Course User Index  [MD] Jackie Arzola APRN      Labs Reviewed   COMPREHENSIVE METABOLIC PANEL - Abnormal; Notable for the following components:       Result Value    Glucose 100 (*)     All other components within  "normal limits    Narrative:     GFR Normal >60  Chronic Kidney Disease <60  Kidney Failure <15     PROTIME-INR - Abnormal; Notable for the following components:    INR <0.93 (*)     All other components within normal limits   APTT - Abnormal; Notable for the following components:    PTT 26.5 (*)     All other components within normal limits   CBC WITH AUTO DIFFERENTIAL - Abnormal; Notable for the following components:    WBC 13.10 (*)     Hemoglobin 11.7 (*)     Neutrophils, Absolute 9.30 (*)     All other components within normal limits   D-DIMER, QUANTITATIVE - Normal    Narrative:     According to the assay 's published package insert, a normal (<0.50 mg/L (FEU)) D-dimer result in conjunction with a non-high clinical probability assessment, excludes deep vein thrombosis (DVT) and pulmonary embolism (PE) with high sensitivity.    D-dimer values increase with age and this can make VTE exclusion of an older population difficult. To address this, the [employer], based on best available evidence and recent guidelines, recommends that clinicians use age-adjusted D-dimer thresholds in patients greater than 50 years of age with: a) a low probability of PE who do not meet all Pulmonary Embolism Rule Out Criteria, or b) in those with intermediate probability of PE.   The formula for an age-adjusted D-dimer cut-off is \"age/100\".  For example, a 60 year old patient would have an age-adjusted cut-off of 0.60 mg/L (FEU) and an 80 year old 0.80 mg/L (FEU).   TROPONIN - Normal    Narrative:     High Sensitive Troponin T Reference Range:  <14.0 ng/L- Negative Female for AMI  <22.0 ng/L- Negative Male for AMI  >=14 - Abnormal Female indicating possible myocardial injury.  >=22 - Abnormal Male indicating possible myocardial injury.   Clinicians would have to utilize clinical acumen, EKG, Troponin, and serial changes to determine if it is an Acute Myocardial Infarction or myocardial injury due to an " underlying chronic condition.        HIGH SENSITIVITIY TROPONIN T 2HR - Normal    Narrative:     High Sensitive Troponin T Reference Range:  <14.0 ng/L- Negative Female for AMI  <22.0 ng/L- Negative Male for AMI  >=14 - Abnormal Female indicating possible myocardial injury.  >=22 - Abnormal Male indicating possible myocardial injury.   Clinicians would have to utilize clinical acumen, EKG, Troponin, and serial changes to determine if it is an Acute Myocardial Infarction or myocardial injury due to an underlying chronic condition.        CBC AND DIFFERENTIAL    Narrative:     The following orders were created for panel order CBC & Differential.  Procedure                               Abnormality         Status                     ---------                               -----------         ------                     CBC Auto Differential[[id number]]        Abnormal            Final result               Scan Slide[[id number]]                                                                    Please view results for these tests on the individual orders.     CT Head Without Contrast    Result Date: 2/17/2024  Impression: No acute intracranial process identified. Electronically Signed: Caterina Luna MD  2/17/2024 7:14 AM EST  Workstation ID: [id number]    XR Chest 1 View    Result Date: 2/17/2024  Impression: No acute cardiopulmonary process. Electronically Signed: Caterina Luna MD  2/17/2024 6:56 AM EST  Workstation ID: [id number]   Medications   diphenhydrAMINE (BENADRYL) injection 25 mg (25 mg Intravenous Given 2/17/24 0629)   prochlorperazine (COMPAZINE) injection 10 mg (10 mg Intravenous Given 2/17/24 0631)                                            Medical Decision Making  Problems Addressed:  Acute nonintractable headache, unspecified headache type: complicated acute illness or injury  Pleuritic chest pain: complicated acute illness or injury    Amount and/or Complexity of Data Reviewed  Labs: ordered.  Radiology:  ordered.  ECG/medicine tests: ordered and independent interpretation performed.     Details: EKG interpretation: Normal sinus rhythm, rate 68, no acute ST change    Risk  Prescription drug management.        Final diagnoses:   Pleuritic chest pain   Acute nonintractable headache, unspecified headache type       ED Disposition  ED Disposition       ED Disposition   Discharge    Condition   Stable    Comment   --               Brigitte Awan, APRN  [address] [facility] [facility] [place] [address] [phone]               Medication List        New Prescriptions      naproxen 500 MG EC tablet  Commonly known as: EC NAPROSYN  Take 1 tablet by mouth 2 (Two) Times a Day With Meals for 5 days.               Where to Get Your Medications        These medications were sent to [id number] - [place], [place] - [address] - [phone]  - [phone] FX  [address] [place] [address]      Phone: [phone]   naproxen 500 MG EC tablet            Jackie Arzola APRN  02/17/24 3721

## 2024-02-17 NOTE — ED NOTES
Bedside report from Becca.  Nurse rounded on patient. Patient is comfortable, denies pain, has been repositioned, and has been offered toileting. Patient has been updated and all questions answered.

## 2024-02-17 NOTE — DISCHARGE INSTRUCTIONS
Take medication as prescribed.  Continue current home medications.  Drink plenty of fluids.  Follow-up with her primary care provider.  Return for new or worsening symptoms.

## 2024-04-16 NOTE — PROGRESS NOTES
Subjective: Vertigo and migraine    Patient ID: Nataliya Wesley is a 43 y.o. female.    History of Present Illness Ms. Matias is a very pleasant 43-year-old  female who is following up today on chronic migraine and vertigo.  She reports that sumatriptan does stop her migraines and she is very pleased with the medication.  She reports she is having moderate nausea with the migraine and would like a medication to help  treat the nausea.  She states that gabapentin has decreased the frequency of her migraine but she feels that should be increased at bedtime.  She states her vertigo improved but she still has vertigo and she would like to go back to vestibular rehab.      MRI of the brain without contrast completed 12/15/2023 at [facility] hyperintensities that are thought to be chronic and related to migraine.  No signs of intracranial hypertension.  This was compared to CT from 10/25/2009     Compared to CT of the head 10/25/2009  Impression: No acute intracranial abnormality.  A few scattered periventricular and subcortical T2/FLAIR hyperintensities are nonspecific.  These may suggest chronic migraines.  Alternately this may represent posttraumatic postinfectious postinflammatory or post demyelinating process.  No evidence of active disease.  Incidentally noted partially empty sella.  No other findings to suggest intracranial hypertension.  Please correlate clinically.  Signed by Oneal Means DO    The following portions of the patient's history were reviewed and updated as appropriate: allergies, current medications, past family history, past medical history, past social history, past surgical history and problem list.    Family History   Problem Relation Age of Onset    Hypertension Mother     Migraines Mother        Past Medical History:   Diagnosis Date    Cluster headache     Have had for a long time.    CTS (carpal tunnel syndrome) 08/23    Mild case    Headache, tension-type     For  a long time    Hypertension     Migraine     For a long time    Sleep apnea 2005       Social History     Socioeconomic History    Marital status:    Tobacco Use    Smoking status: Never    Smokeless tobacco: Never   Substance and Sexual Activity    Alcohol use: Not Currently     Comment: once a year     Drug use: Never    Sexual activity: Yes     Partners: Male     Birth control/protection: Hysterectomy         Current Outpatient Medications:     buPROPion XL (WELLBUTRIN XL) 150 MG 24 hr tablet, 1 tablet in the morning Orally Once a day, Disp: , Rfl:     FLUoxetine (PROzac) 40 MG capsule, Take 1 capsule by mouth Daily., Disp: , Rfl:     gabapentin (NEURONTIN) 300 MG capsule, Take 1 tab twice a day and  2 tab hs, Disp: 120 capsule, Rfl: 5    loratadine (CLARITIN) 10 MG tablet, , Disp: , Rfl:     METFORMIN HCL PO, Take  by mouth., Disp: , Rfl:     metoprolol succinate XL (TOPROL-XL) 25 MG 24 hr tablet, Take 1 tablet by mouth once daily, Disp: 30 tablet, Rfl: 0    metoprolol tartrate (LOPRESSOR) 25 MG tablet, Every 12 (Twelve) Hours., Disp: , Rfl:     sertraline (ZOLOFT) 100 MG tablet, 1 tablet Daily., Disp: , Rfl:     SUMAtriptan (IMITREX) 100 MG tablet, Take 1 tab at onset of Migraine. May repeat dose one time after 2 hours if Migraine not stopped.  Max 2/24 or 3 days a week., Disp: 30 tablet, Rfl: 6    triamterene-hydrochlorothiazide (MAXZIDE-25) 37.5-25 MG per tablet, Take 1 tablet by mouth Daily., Disp: , Rfl:     ondansetron (Zofran) 4 MG tablet, Take 2 tablets by mouth Every 12 (Twelve) Hours As Needed for Nausea or Vomiting., Disp: 30 tablet, Rfl: 1    Review of Systems   Constitutional:  Positive for fatigue.   Endocrine: Positive for heat intolerance.   Neurological:  Positive for dizziness, light-headedness, numbness and headaches.   Hematological:  Bruises/bleeds easily.   Psychiatric/Behavioral:  Positive for decreased concentration.    All other systems reviewed and are negative.         I have  reviewed ROS completed by medical assistant.     Objective:    Neurologic Exam     Mental Status   Oriented to person, place, and time.   Speech: speech is normal     Cranial Nerves     CN III, IV, VI   Pupils are equal, round, and reactive to light.    Physical Exam  Vitals reviewed.   Constitutional:       Appearance: Normal appearance. She is well-developed and well-groomed. She is obese.   HENT:      Head: Normocephalic and atraumatic.      Right Ear: Hearing normal.      Left Ear: Hearing normal.      Nose: Nose normal.      Mouth/Throat:      Lips: Pink.      Mouth: Mucous membranes are moist.   Eyes:      General: Lids are normal. No visual field deficit.     Pupils: Pupils are equal, round, and reactive to light.   Cardiovascular:      Rate and Rhythm: Normal rate.   Pulmonary:      Effort: Pulmonary effort is normal.   Musculoskeletal:         General: Normal range of motion.   Skin:     General: Skin is warm and dry.   Neurological:      Mental Status: She is alert and oriented to person, place, and time.      Cranial Nerves: No dysarthria or facial asymmetry.      Motor: No weakness or tremor.      Gait: Gait normal.   Psychiatric:         Attention and Perception: Attention and perception normal.         Mood and Affect: Mood normal.         Speech: Speech normal.         Behavior: Behavior is cooperative.         Thought Content: Thought content normal.     Assessment/Plan:  Discussion: The patient will be continued on Imitrex as it has been beneficial in stopping her migraines.  She was ordered Zofran 4 mg for nausea and can take 1 to 2 pills every 12 hours as needed for nausea.  Gabapentin will be increased to 1 in the morning 1 in the afternoon and 2 at bedtime.  She will be referred back to vestibular rehab and was also informed of Bonine which is a over-the-counter meclizine that she could use as needed.  She will be scheduled back for a follow-up visit in 6 months and is to call if she has any  questions or concerns.    Diagnoses and all orders for this visit:    1. Nausea and vomiting, unspecified vomiting type (Primary)  -     ondansetron (Zofran) 4 MG tablet; Take 2 tablets by mouth Every 12 (Twelve) Hours As Needed for Nausea or Vomiting.  Dispense: 30 tablet; Refill: 1    2. Migraine with aura and without status migrainosus, not intractable  -     SUMAtriptan (IMITREX) 100 MG tablet; Take 1 tab at onset of Migraine. May repeat dose one time after 2 hours if Migraine not stopped.  Max 2/24 or 3 days a week.  Dispense: 30 tablet; Refill: 6  -     gabapentin (NEURONTIN) 300 MG capsule; Take 1 tab twice a day and  2 tab hs  Dispense: 120 capsule; Refill: 5    3. Vertigo  -     Ambulatory Referral to Physical Therapy Vestibular          Return in about 6 months (around 10/17/2024) for Next scheduled follow up.     I spent 19 minutes caring for Nataliya on this date of service. This time includes time spent by me in the following activities: preparing for the visit, reviewing tests, obtaining and/or reviewing a separately obtained history, performing a medically appropriate examination and/or evaluation, ordering medications, tests, or procedures, and documenting information in the medical record.      This document has been electronically signed by TREVON Carroll on April 17, 2024 16:43 EDT

## 2024-04-17 ENCOUNTER — OFFICE VISIT (OUTPATIENT)
Dept: NEUROLOGY | Facility: CLINIC | Age: 44
End: 2024-04-17
Payer: MEDICAID

## 2024-04-17 VITALS
HEIGHT: 61 IN | HEART RATE: 69 BPM | BODY MASS INDEX: 49.09 KG/M2 | SYSTOLIC BLOOD PRESSURE: 141 MMHG | WEIGHT: 260 LBS | DIASTOLIC BLOOD PRESSURE: 90 MMHG

## 2024-04-17 DIAGNOSIS — R11.2 NAUSEA AND VOMITING, UNSPECIFIED VOMITING TYPE: Primary | ICD-10-CM

## 2024-04-17 DIAGNOSIS — G43.109 MIGRAINE WITH AURA AND WITHOUT STATUS MIGRAINOSUS, NOT INTRACTABLE: ICD-10-CM

## 2024-04-17 DIAGNOSIS — R42 VERTIGO: ICD-10-CM

## 2024-04-17 PROCEDURE — 1160F RVW MEDS BY RX/DR IN RCRD: CPT | Performed by: NURSE PRACTITIONER

## 2024-04-17 PROCEDURE — 3080F DIAST BP >= 90 MM HG: CPT | Performed by: NURSE PRACTITIONER

## 2024-04-17 PROCEDURE — 3077F SYST BP >= 140 MM HG: CPT | Performed by: NURSE PRACTITIONER

## 2024-04-17 PROCEDURE — 1159F MED LIST DOCD IN RCRD: CPT | Performed by: NURSE PRACTITIONER

## 2024-04-17 PROCEDURE — 99212 OFFICE O/P EST SF 10 MIN: CPT | Performed by: NURSE PRACTITIONER

## 2024-04-17 RX ORDER — ONDANSETRON 4 MG/1
8 TABLET, FILM COATED ORAL EVERY 12 HOURS PRN
Qty: 30 TABLET | Refills: 1 | Status: SHIPPED | OUTPATIENT
Start: 2024-04-17 | End: 2025-04-17

## 2024-04-17 RX ORDER — SUMATRIPTAN 100 MG/1
TABLET, FILM COATED ORAL
Qty: 30 TABLET | Refills: 6 | Status: SHIPPED | OUTPATIENT
Start: 2024-04-17

## 2024-04-17 RX ORDER — GABAPENTIN 300 MG/1
CAPSULE ORAL
Qty: 120 CAPSULE | Refills: 5 | Status: SHIPPED | OUTPATIENT
Start: 2024-04-17

## 2024-05-10 ENCOUNTER — TREATMENT (OUTPATIENT)
Dept: PHYSICAL THERAPY | Facility: CLINIC | Age: 44
End: 2024-05-10
Payer: MEDICAID

## 2024-05-10 DIAGNOSIS — R42 VERTIGO: Primary | ICD-10-CM

## 2024-05-16 ENCOUNTER — TELEPHONE (OUTPATIENT)
Dept: PHYSICAL THERAPY | Facility: CLINIC | Age: 44
End: 2024-05-16

## 2024-05-16 NOTE — TELEPHONE ENCOUNTER
[facility] Medicine Services  PROGRESS NOTE    Patient Name: Monty Nagel  : 1954  MRN: [id number]    Date of Admission: 3/11/2023  Primary Care Physician: Tiffany Morales MD    Subjective   Subjective     CC: Follow-up meningioma    HPI: Patient not have a very good night, did endorse some abdominal discomfort    ROS:  Gen- No fevers, chills  CV- No chest pain, palpitations  Resp- No cough, dyspnea  GI- No N/V/D, +abd pain    Objective   Objective     Vital Signs:   Temp:  [98.2 °F (36.8 °C)-99.2 °F (37.3 °C)] 98.2 °F (36.8 °C)  Heart Rate:  [73-84] 79  Resp:  [18-20] 18  BP: (118-146)/() 146/99     Physical Exam:  Constitutional: Chronically ill male, in no acute distress, awake, alert  HENT: NCAT, mucous membranes moist  Respiratory: Clear to auscultation bilaterally, respiratory effort normal   Cardiovascular: RRR, no murmurs, rubs, or gallops  Gastrointestinal: Positive bowel sounds, soft, nontender, nondistended  Musculoskeletal: No bilateral ankle edema, right great toe lesion  Psychiatric: Appropriate affect, cooperative  Neurologic: Oriented x 3, nonfocal  Skin: No rashes, cranial incision CDI    Results Reviewed:  LAB RESULTS:      Lab 23  0422 23  0412 23  0435 23  0955 23  0421   WBC 14.02* 18.31* 16.01*  --  9.20   HEMOGLOBIN 11.8* 12.7* 12.6*  --  11.4*   HEMOGLOBIN, POC  --   --   --  10.5*  --    HEMATOCRIT 34.5* 36.9* 35.8*  --  33.5*   HEMATOCRIT POC  --   --   --  31*  --    PLATELETS 215 169 211  --  200   NEUTROS ABS 12.89*  --  14.66*  --   --    IMMATURE GRANS (ABS) 0.11*  --  0.15*  --   --    LYMPHS ABS 0.41*  --  0.47*  --   --    MONOS ABS 0.60  --  0.71  --   --    EOS ABS 0.00  --  0.00  --   --    MCV 86.9 87.6 85.2  --  87.5         Lab 23  1823 03/24/23  0422 03/23/23  18323   SODIUM  --  131*  --  130* 132* 132*   POTASSIUM  --  4.3  --  4.7 4.3 4.3   CHLORIDE     Caller: Nataliya Wesley    Relationship: Self    What was the call regarding: PATIENT WILL NOT BE AT TODAY'S APPT.  SHE IS NOT FEELING WELL.   --  97*  --  94* 96* 97*   CO2  --  30.0*  --  28.0 29.0 30.0*   ANION GAP  --  4.0*  --  8.0 7.0 5.0   BUN  --  20  --  25* 26* 24*   CREATININE  --  0.41*  --  0.64* 0.63* 0.76   EGFR  --  117.2  --  102.5 103.0 97.3   GLUCOSE  --  128*  --  121* 156* 110*   CALCIUM  --  7.6*  --  7.5* 7.9* 7.7*   IONIZED CALCIUM  --   --   --   --  1.16  --    MAGNESIUM  --   --   --  2.0 2.0 2.0   PHOSPHORUS 2.1* 2.1* 1.9* 2.2* 3.2 3.5         Lab 03/24/23  0422 03/23/23  0412   TOTAL PROTEIN  --  4.8*   ALBUMIN 2.5* 2.8*   GLOBULIN  --  2.0   ALT (SGPT)  --  17   AST (SGOT)  --  17   BILIRUBIN  --  0.6   ALK PHOS  --  52                 Lab 03/21/23  0805   ABO TYPING A   RH TYPING Positive   ANTIBODY SCREEN Negative         Lab 03/21/23  0955   PH, ARTERIAL 7.40     Brief Urine Lab Results  (Last result in the past 365 days)      Color   Clarity   Blood   Leuk Est   Nitrite   Protein   CREAT   Urine HCG        03/11/23 1058 Dark Yellow   Clear   Negative   Negative   Negative   Negative                 Microbiology Results Abnormal     None          No radiology results from the last 24 hrs    Results for orders placed during the hospital encounter of 03/11/23    Adult Transthoracic Echo Complete w/ Color, Spectral and Contrast if necessary per protocol    Interpretation Summary  •  Left ventricular systolic function is normal. Estimated left ventricular EF = 55%  •  Biatrial enlargement.  •  Calcified aortic valve with mild aortic stenosis, mean gradient 10 mmHg, BANG 1.6 cm².  •  Moderate aortic insufficiency.  •  Mild mitral regurgitation.  •  Mild tricuspid regurgitation with normal RVSP.  •  Mild dilation of the ascending aorta (4.2 cm) is present.      Current medications:  Scheduled Meds:atorvastatin, 40 mg, Oral, Nightly  dexamethasone, 4 mg, Oral, Q6H  enoxaparin, 40 mg, Subcutaneous, Daily  levETIRAcetam, 1,000 mg, Oral, Q12H  levothyroxine, 125 mcg, Oral, Daily  pantoprazole, 40 mg, Oral, Q AM  polyethylene glycol,  17 g, Oral, Daily  povidone-iodine, 1 application, Topical, Daily  senna-docusate sodium, 2 tablet, Oral, BID  sodium chloride, 10 mL, Intravenous, Q12H  thiamine, 100 mg, Oral, Daily      Continuous Infusions:   PRN Meds:.•  acetaminophen **OR** acetaminophen **OR** acetaminophen  •  senna-docusate sodium **AND** polyethylene glycol **AND** bisacodyl **AND** bisacodyl  •  docusate sodium  •  HYDROmorphone **AND** naloxone  •  magnesium hydroxide  •  Magnesium Standard Dose Replacement - Follow Nurse / BPA Driven Protocol  •  melatonin  •  ondansetron **OR** ondansetron  •  Phosphorus Replacement - Follow Nurse / BPA Driven Protocol  •  Potassium Replacement - Follow Nurse / BPA Driven Protocol  •  sodium chloride  •  sodium chloride  •  sodium chloride    Assessment & Plan   Assessment & Plan     Active Hospital Problems    Diagnosis  POA   • **Meningioma (HCC) [D32.9]  Yes   • Rheumatoid vs Psoriatic arthritis ([facility]) [L40.50]  Yes   • Atrial fibrillation (HCC) [I48.91]  Yes   • Other recurrent depressive disorders ([facility]) [F33.8]  Yes   • Postablative hypothyroidism [E89.0]  Yes   • Essential hypertension [I10]  Yes   • History of right retinal melanoma with right eye blindness [Z85.820]  Not Applicable      Resolved Hospital Problems   No resolved problems to display.        Brief Hospital Course to date:  Monty Nagel is a 68 y.o. male with history of hypertension, retinal melanoma and subsequent right eye blindness, previous alcohol use who was admitted for meningioma, gait instability and general weakness.  Brain MRI showed 6.4 enhancing mass and he was evaluated by neurosurgery.  He underwent right frontal craniotomy on 3/21 as well as right middle meningeal artery embolization with Dr. Mckee in anticipation for tumor resection.  He was transferred from ICU to medical floor on 9/22     Meningioma,'s s/p tumor embolization 3/20 by Dr. Sebastian and resection 3/21 by Dr. Mckee  Generalized weakness, frequent falls  secondary to above  -Continue ASA Plavix when okay with neurosurgery  -Continue Decadron and Keppra, per neurosurgery plan for steroid taper at discharge  -PT/OT following, plan for rehab  -He will follow-up with Dr. Mckee in 2 weeks post-discharge     Hypertension  -BP is much improved, ok to resume antihypertensives  -Patient is on lisinopril- HCTZ, will resume HCTZ 12.5 mg daily today     Hyperlipidemia  -Continue statin     New A-fib  -Rates currently controlled  -Currently on subcu Lovenox     Hypothyroidism  History of thyroid ablation secondary to Graves' disease  - continue Synthroid     Former EtOH, heavy use  -Stopped drinking 9/22     Hyponatremia, not significant  -Monitor while on Keppra, encourage protein intake  -Na+ is low but stable, will continue to monitor for now.       Right great toe lesion  -PT-wound care following  -may need a bx, Dr. Flores consulted    Expected Discharge Location and Transportation: rehab  Expected Discharge   Expected Discharge Date and Time     Expected Discharge Date Expected Discharge Time    Mar 28, 2023            DVT prophylaxis:  Medical and mechanical DVT prophylaxis orders are present.     AM-PAC 6 Clicks Score (PT): 19 (03/26/23 0805)    CODE STATUS:   Code Status and Medical Interventions:   Ordered at: 03/21/23 1235     Level Of Support Discussed With:    Patient     Code Status (Patient has no pulse and is not breathing):    CPR (Attempt to Resuscitate)     Medical Interventions (Patient has pulse or is breathing):    Full       Fiorella Manzanares MD  03/27/23

## 2024-05-24 ENCOUNTER — TREATMENT (OUTPATIENT)
Dept: PHYSICAL THERAPY | Facility: CLINIC | Age: 44
End: 2024-05-24
Payer: MEDICAID

## 2024-05-24 DIAGNOSIS — R42 VERTIGO: Primary | ICD-10-CM

## 2024-05-24 NOTE — PROGRESS NOTES
Physical Therapy Daily Treatment Note    [facility], [address]  [place] [address]  [phone]    Patient: Nataliya Wesley  : 1980  Referring practitioner: Aurelia Rogers, *  Diagnosis/ ICD10 code: Vertigo [R42]  Today's Date: 2024    VISIT#: 2    Subjective   Pt reports: doing better but still has occasional episodes but not intense.       Objective        BPPV Assessment:    [facility]:  Right: pos for subjective co, immediate onset,  no nystagmus, symptoms lasting for about 5' , increase   symptoms upon sitting up from testing position     Left: pos for subjective co, no nystagmus, less intense than  R side and shorter duration, inc symptoms upon sitting up from testing position.      Treatment :  pt received R Epley maneuver f/b post rx precautions for 24 hrs.  Pt verbalized understanding of home instructions .         Patient Education:    Assessment & Plan       Assessment  Assessment details: Pt tolerated today's rx session well.  DHP still pos on both sides but R seems to be stronger.  Tolerated Epley maneuver well today. Has responded well to previous rx. Her symptoms were milder today.           Progress per Plan of Care            Timed:         Manual Therapy:         mins  01639;     Therapeutic Exercise:         mins  68743;     Neuromuscular Rosangela:   13    mins  98353;    Therapeutic Activity:          mins  38806;     Gait Training:           mins  91782;     Ultrasound:          mins  67001;    Ionto                                   mins   02764  Self Care                            mins   50770      Un-Timed:  Electrical Stimulation:         mins  95316 (MC );  Traction          mins 33222  Canal repositioning      10     mins    84961        Timed Treatment:  13    mins   Total Treatment:     23   mins    Jase Mendiola PT, CLT  Physical Therapist  [place] License:  # [id number]

## 2024-05-31 ENCOUNTER — TREATMENT (OUTPATIENT)
Dept: PHYSICAL THERAPY | Facility: CLINIC | Age: 44
End: 2024-05-31
Payer: MEDICAID

## 2024-05-31 DIAGNOSIS — R42 VERTIGO: Primary | ICD-10-CM

## 2024-05-31 PROCEDURE — 97112 NEUROMUSCULAR REEDUCATION: CPT | Performed by: PHYSICAL THERAPIST

## 2024-05-31 NOTE — PROGRESS NOTES
Physical Therapy Daily Treatment Note    [facility], [address]  [place] [address] [phone]    Patient: Nataliya Wesley  : 1980  Referring practitioner: uArelia Rogers, *  Diagnosis/ ICD10 code: Vertigo [R42]  Today's Date: 2024    VISIT#: 3    Subjective   Pt reports: she is doing a lot better and hasn't had any episodes for the past few days.       Objective     DHP: neg B     Patient Education: instructed to drink more water .  Discussed POC     Assessment & Plan       Assessment  Assessment details: Pt has responded well to CRM. Today DHP was neg B . She hasn't had any episodes for a few days. Has met all goals.     Goals  Plan Goals: Plan Goals: STGs: in 4 weeks     1- Pt will repot at least 50 % improvement and symptom reduction - MET        LTGs: By DC      1- Pt will report 100 % improvement and symptom reduction- MET  2- Pt will be independent with self management of her condition - MET  3- Pt will have improved DHI score indicating improved function and symptoms reduction  4- Pt will be have neg DHP and Roll test Bilateraly- MET      Plan  Plan details: Will keep chart active till the end of the POC.  Pt to return for rx if become symptomatic.                     Timed:         Manual Therapy:         mins  56817;     Therapeutic Exercise:         mins  50108;     Neuromuscular Rosangela:   15     mins  50193;    Therapeutic Activity:          mins  57467;     Gait Training:           mins  46300;     Ultrasound:          mins  18617;    Ionto                                   mins   54706  Self Care                            mins   14653      Un-Timed:  Electrical Stimulation:         mins  34915 ( );  Traction          mins 89201  Canal repositioning           mins    01201        Timed Treatment:  15    mins   Total Treatment:    15    mins    Jase Mendiola, PT, CLT  Physical Therapist  [place] License:  # [id number]

## 2024-08-02 ENCOUNTER — HOSPITAL ENCOUNTER (EMERGENCY)
Facility: HOSPITAL | Age: 44
Discharge: HOME | End: 2024-08-02
Attending: EMERGENCY MEDICINE
Payer: MEDICAID

## 2024-08-02 ENCOUNTER — APPOINTMENT (OUTPATIENT)
Dept: CT IMAGING | Facility: HOSPITAL | Age: 44
End: 2024-08-02
Payer: MEDICAID

## 2024-08-02 VITALS
DIASTOLIC BLOOD PRESSURE: 89 MMHG | HEART RATE: 78 BPM | WEIGHT: 266.2 LBS | HEIGHT: 61 IN | BODY MASS INDEX: 50.26 KG/M2 | SYSTOLIC BLOOD PRESSURE: 157 MMHG | TEMPERATURE: 97.3 F | OXYGEN SATURATION: 90 % | RESPIRATION RATE: 18 BRPM

## 2024-08-02 DIAGNOSIS — N20.0 RIGHT KIDNEY STONE: Primary | ICD-10-CM

## 2024-08-02 LAB
ANION GAP SERPL CALCULATED.3IONS-SCNC: 8.1 MMOL/L (ref 5–15)
BASOPHILS # BLD AUTO: 0.03 10*3/MM3 (ref 0–0.2)
BASOPHILS NFR BLD AUTO: 0.2 % (ref 0–1.5)
BILIRUB UR QL STRIP: NEGATIVE
BUN SERPL-MCNC: 17 MG/DL (ref 6–20)
BUN/CREAT SERPL: 15.5 (ref 7–25)
CALCIUM SPEC-SCNC: 9.2 MG/DL (ref 8.6–10.5)
CHLORIDE SERPL-SCNC: 105 MMOL/L (ref 98–107)
CLARITY UR: ABNORMAL
CO2 SERPL-SCNC: 24.9 MMOL/L (ref 22–29)
COLOR UR: YELLOW
CREAT SERPL-MCNC: 1.1 MG/DL (ref 0.57–1)
DEPRECATED RDW RBC AUTO: 43.5 FL (ref 37–54)
EGFRCR SERPLBLD CKD-EPI 2021: 63.3 ML/MIN/1.73
EOSINOPHIL # BLD AUTO: 0.06 10*3/MM3 (ref 0–0.4)
EOSINOPHIL NFR BLD AUTO: 0.4 % (ref 0.3–6.2)
ERYTHROCYTE [DISTWIDTH] IN BLOOD BY AUTOMATED COUNT: 13.4 % (ref 12.3–15.4)
GLUCOSE SERPL-MCNC: 111 MG/DL (ref 65–99)
GLUCOSE UR STRIP-MCNC: NEGATIVE MG/DL
HCT VFR BLD AUTO: 36.1 % (ref 34–46.6)
HGB BLD-MCNC: 11.6 G/DL (ref 12–15.9)
HGB UR QL STRIP.AUTO: ABNORMAL
IMM GRANULOCYTES # BLD AUTO: 0.02 10*3/MM3 (ref 0–0.05)
IMM GRANULOCYTES NFR BLD AUTO: 0.1 % (ref 0–0.5)
KETONES UR QL STRIP: NEGATIVE
LEUKOCYTE ESTERASE UR QL STRIP.AUTO: NEGATIVE
LYMPHOCYTES # BLD AUTO: 2.42 10*3/MM3 (ref 0.7–3.1)
LYMPHOCYTES NFR BLD AUTO: 15.2 % (ref 19.6–45.3)
MCH RBC QN AUTO: 28 PG (ref 26.6–33)
MCHC RBC AUTO-ENTMCNC: 32.1 G/DL (ref 31.5–35.7)
MCV RBC AUTO: 87 FL (ref 79–97)
MONOCYTES # BLD AUTO: 0.77 10*3/MM3 (ref 0.1–0.9)
MONOCYTES NFR BLD AUTO: 4.8 % (ref 5–12)
NEUTROPHILS NFR BLD AUTO: 12.59 10*3/MM3 (ref 1.7–7)
NEUTROPHILS NFR BLD AUTO: 79.3 % (ref 42.7–76)
NITRITE UR QL STRIP: NEGATIVE
PH UR STRIP.AUTO: 5.5 [PH] (ref 5–8)
PLATELET # BLD AUTO: 343 10*3/MM3 (ref 140–450)
PMV BLD AUTO: 10.2 FL (ref 6–12)
POTASSIUM SERPL-SCNC: 4 MMOL/L (ref 3.5–5.2)
PROT UR QL STRIP: ABNORMAL
RBC # BLD AUTO: 4.15 10*6/MM3 (ref 3.77–5.28)
SODIUM SERPL-SCNC: 138 MMOL/L (ref 136–145)
SP GR UR STRIP: >=1.03 (ref 1–1.03)
UROBILINOGEN UR QL STRIP: ABNORMAL
WBC NRBC COR # BLD AUTO: 15.89 10*3/MM3 (ref 3.4–10.8)

## 2024-08-02 PROCEDURE — 96374 THER/PROPH/DIAG INJ IV PUSH: CPT

## 2024-08-02 PROCEDURE — 96361 HYDRATE IV INFUSION ADD-ON: CPT

## 2024-08-02 PROCEDURE — 25010000002 ONDANSETRON PER 1 MG: Performed by: EMERGENCY MEDICINE

## 2024-08-02 PROCEDURE — 74176 CT ABD & PELVIS W/O CONTRAST: CPT

## 2024-08-02 PROCEDURE — 99283 EMERGENCY DEPT VISIT LOW MDM: CPT | Performed by: EMERGENCY MEDICINE

## 2024-08-02 PROCEDURE — 25810000003 SODIUM CHLORIDE 0.9 % SOLUTION: Performed by: EMERGENCY MEDICINE

## 2024-08-02 PROCEDURE — 99284 EMERGENCY DEPT VISIT MOD MDM: CPT

## 2024-08-02 PROCEDURE — 25010000002 KETOROLAC TROMETHAMINE PER 15 MG: Performed by: EMERGENCY MEDICINE

## 2024-08-02 PROCEDURE — 81003 URINALYSIS AUTO W/O SCOPE: CPT | Performed by: EMERGENCY MEDICINE

## 2024-08-02 PROCEDURE — 85025 COMPLETE CBC W/AUTO DIFF WBC: CPT | Performed by: EMERGENCY MEDICINE

## 2024-08-02 PROCEDURE — 80048 BASIC METABOLIC PNL TOTAL CA: CPT | Performed by: EMERGENCY MEDICINE

## 2024-08-02 PROCEDURE — 96375 TX/PRO/DX INJ NEW DRUG ADDON: CPT

## 2024-08-02 RX ORDER — KETOROLAC TROMETHAMINE 30 MG/ML
30 INJECTION, SOLUTION INTRAMUSCULAR; INTRAVENOUS ONCE
Status: COMPLETED | OUTPATIENT
Start: 2024-08-02 | End: 2024-08-02

## 2024-08-02 RX ORDER — ONDANSETRON 2 MG/ML
4 INJECTION INTRAMUSCULAR; INTRAVENOUS ONCE
Status: COMPLETED | OUTPATIENT
Start: 2024-08-02 | End: 2024-08-02

## 2024-08-02 RX ORDER — PROMETHAZINE HYDROCHLORIDE 25 MG/1
25 TABLET ORAL EVERY 6 HOURS PRN
Qty: 20 TABLET | Refills: 0 | Status: SHIPPED | OUTPATIENT
Start: 2024-08-02

## 2024-08-02 RX ORDER — DICLOFENAC SODIUM 75 MG/1
75 TABLET, DELAYED RELEASE ORAL 2 TIMES DAILY
Qty: 30 TABLET | Refills: 0 | Status: SHIPPED | OUTPATIENT
Start: 2024-08-02

## 2024-08-02 RX ADMIN — SODIUM CHLORIDE 1000 ML: 9 INJECTION, SOLUTION INTRAVENOUS at 11:12

## 2024-08-02 RX ADMIN — KETOROLAC TROMETHAMINE 30 MG: 30 INJECTION, SOLUTION INTRAMUSCULAR at 11:12

## 2024-08-02 RX ADMIN — ONDANSETRON 4 MG: 2 INJECTION INTRAMUSCULAR; INTRAVENOUS at 11:12

## 2024-08-02 NOTE — FSED PROVIDER NOTE
Subjective   History of Present Illness  Pt presents with r. Flank pain, hematuria and nausea that started yesterday worsening today of moderate severity, prior hx of stones some requiring surgical removal, no v/d and dago po, pt s/p hysterectomy, no cough/uri/f, no cp/soa/ha, no alleviating factors    History provided by:  Patient   used: No        Review of Systems   Respiratory:  Negative for apnea.    Gastrointestinal:  Positive for nausea.   Genitourinary:  Positive for hematuria.   All other systems reviewed and are negative.      Past Medical History:   Diagnosis Date    Cluster headache     Have had for a long time.    CTS (carpal tunnel syndrome) 08/23    Mild case    Headache, tension-type     For a long time    Hypertension     Migraine     For a long time    Sleep apnea 2005       Allergies   Allergen Reactions    Lisinopril Unknown - High Severity    Topamax [Topiramate] Other (See Comments)     Marked prior history of kidney stones.  Topamax is contraindicated due to possibility of causing kidney stones.       Past Surgical History:   Procedure Laterality Date    HYSTERECTOMY  10/20/2020       Family History   Problem Relation Age of Onset    Hypertension Mother     Migraines Mother        Social History     Socioeconomic History    Marital status:    Tobacco Use    Smoking status: Never    Smokeless tobacco: Never   Substance and Sexual Activity    Alcohol use: Not Currently     Comment: once a year     Drug use: Never    Sexual activity: Yes     Partners: Male     Birth control/protection: Hysterectomy           Objective   Physical Exam  Vitals and nursing note reviewed.   HENT:      Head: Normocephalic.      Nose: Nose normal.      Mouth/Throat:      Mouth: Mucous membranes are moist.   Eyes:      Conjunctiva/sclera: Conjunctivae normal.   Cardiovascular:      Rate and Rhythm: Normal rate.   Pulmonary:      Effort: Pulmonary effort is normal.   Abdominal:      Palpations:  Abdomen is soft.      Tenderness: There is no guarding.   Musculoskeletal:         General: Normal range of motion.      Cervical back: Normal range of motion.   Skin:     General: Skin is warm.   Neurological:      General: No focal deficit present.      Mental Status: She is alert.   Psychiatric:         Mood and Affect: Mood normal.         Procedures           ED Course                                           Medical Decision Making  Ct abd shows 8mm distal stone with hydro  Wbc elevated to 15k c/w stone  Cmp stable with bun/ crt 17/1.1  Ua with blood and no infection  Pt feeling sig better, she will call Urology today and get rapid f/u  RTER d.    Amount and/or Complexity of Data Reviewed  Labs: ordered. Decision-making details documented in ED Course.  Radiology: ordered. Decision-making details documented in ED Course.    Risk  Prescription drug management.        Final diagnoses:   Right kidney stone       ED Disposition  ED Disposition       ED Disposition   Discharge    Condition   Stable    Comment   --                 Call [facility] today for follow up appt  Schedule an appointment as soon as possible for a visit            Medication List        New Prescriptions      diclofenac 75 MG EC tablet  Commonly known as: VOLTAREN  Take 1 tablet by mouth 2 (Two) Times a Day.     promethazine 25 MG tablet  Commonly known as: PHENERGAN  Take 1 tablet by mouth Every 6 (Six) Hours As Needed for Nausea or Vomiting.               Where to Get Your Medications        These medications were sent to [id number] - [place], [place] - [address] - [phone]  - [phone]   [address] [place] [address]      Phone: [phone]   diclofenac 75 MG EC tablet  promethazine 25 MG tablet

## 2025-03-03 NOTE — PROGRESS NOTES
Subjective: Chronic migraine                       Vertigo    Patient ID: Nataliya Wesley is a 44 y.o. female.    History of Present Illness Ms. Matias is a very pleasant 44-year-old  female who is following up today on chronic migraine and vertigo.  The patient states her migraines are under very good control and sumatriptan will stop them.  She reports since she was last here she is only had 1 migraine.    The patient states her vertigo is under good control as well and that she does have some Bonine if it flares up and was also told she could go back to vestibular rehab if needed.    The patient's blood pressure is extremely high at 191/83.  I offered to retake the blood pressure for the patient however she refused and stated she was going home.  She has seen Dr. Calvillo in the past and may need to go back and see him because of the severe blood pressure issue.       Follow up on migraine  Medication: SUMAtriptan (IMITREX) 100 MG tablet; Take 1 tab at onset of Migraine. May repeat dose one time after 2 hours if Migraine not stopped.  Max 2/24 or 3 days a week.  Dispense: 30 tablet; Refill: 6  -     gabapentin (NEURONTIN) 300 MG   Frequency-1  Duration-couple days  Change in migraine-better  Does  have headache but it might be stress and high blood pressure    Vertigo: The patient has had vestibular rehab and states her vertigo is gone.      The following portions of the patient's history were reviewed and updated as appropriate: allergies, current medications, past family history, past medical history, past social history, past surgical history and problem list.    Family History   Problem Relation Age of Onset    Hypertension Mother     Migraines Mother        Past Medical History:   Diagnosis Date    Cluster headache     Have had for a long time.    CTS (carpal tunnel syndrome) 08/23    Mild case    Headache, tension-type     For a long time    Hypertension     Migraine     For a long time     Sleep apnea 2005       Social History     Socioeconomic History    Marital status:    Tobacco Use    Smoking status: Never    Smokeless tobacco: Never   Substance and Sexual Activity    Alcohol use: Not Currently     Comment: once a year     Drug use: Never    Sexual activity: Yes     Partners: Male     Birth control/protection: Hysterectomy         Current Outpatient Medications:     amLODIPine (NORVASC) 5 MG tablet, Take 1 tablet by mouth Daily., Disp: , Rfl:     buPROPion XL (WELLBUTRIN XL) 150 MG 24 hr tablet, 1 tablet in the morning Orally Once a day, Disp: , Rfl:     diclofenac (VOLTAREN) 75 MG EC tablet, Take 1 tablet by mouth 2 (Two) Times a Day., Disp: 30 tablet, Rfl: 0    FLUoxetine (PROzac) 40 MG capsule, Take 1 capsule by mouth Daily., Disp: , Rfl:     gabapentin (NEURONTIN) 300 MG capsule, Take 1 tab twice a day and  2 tab hs, Disp: 120 capsule, Rfl: 5    hydrOXYzine pamoate (VISTARIL) 25 MG capsule, 1 capsule., Disp: , Rfl:     loratadine (CLARITIN) 10 MG tablet, , Disp: , Rfl:     METFORMIN HCL PO, Take  by mouth., Disp: , Rfl:     metoprolol succinate XL (TOPROL-XL) 25 MG 24 hr tablet, Take 1 tablet by mouth once daily, Disp: 30 tablet, Rfl: 0    metoprolol tartrate (LOPRESSOR) 25 MG tablet, Every 12 (Twelve) Hours., Disp: , Rfl:     ondansetron (Zofran) 4 MG tablet, Take 2 tablets by mouth Every 12 (Twelve) Hours As Needed for Nausea or Vomiting., Disp: 30 tablet, Rfl: 6    promethazine (PHENERGAN) 25 MG tablet, Take 1 tablet by mouth Every 6 (Six) Hours As Needed for Nausea or Vomiting., Disp: 20 tablet, Rfl: 0    sertraline (ZOLOFT) 100 MG tablet, 1 tablet Daily., Disp: , Rfl:     SUMAtriptan (IMITREX) 100 MG tablet, Take 1 tab at onset of Migraine. May repeat dose one time after 2 hours if Migraine not stopped.  Max 2/24 or 3 days a week., Disp: 12 tablet, Rfl: 11    triamterene-hydrochlorothiazide (MAXZIDE-25) 37.5-25 MG per tablet, Take 1 tablet by mouth Daily., Disp: , Rfl:     vitamin  D (ERGOCALCIFEROL) 1.25 MG (16605 UT) capsule capsule, Take 1 capsule by mouth 1 (One) Time Per Week., Disp: , Rfl:     Review of Systems   Neurological:  Positive for dizziness, light-headedness and headaches.   All other systems reviewed and are negative.         I have reviewed ROS completed by medical assistant.     Objective:      Neurological Exam  Mental Status  Awake and alert. Oriented only to person, place and time. Speech is normal. Language is fluent with no aphasia. Attention and concentration are normal. Fund of knowledge is appropriate for level of education.    Cranial Nerves  CN II: Right visual acuity: Normal. Left visual acuity: Normal. Right normal visual field. Left normal visual field.  CN III, IV, VI: Extraocular movements intact bilaterally. No nystagmus. Normal saccades. Normal smooth pursuit.  CN VII:  Right: There is no facial weakness.  Left: There is no facial weakness.    Motor  Normal muscle bulk throughout.    Sensory  Light touch is normal in upper and lower extremities.     Gait  Casual gait is normal including stance, stride, and arm swing.         Assessment/Plan:  Discussion: For migraine the patient will be continued on Imitrex for rescue and gabapentin for prevention.  Zofran will be continued for nausea.    The patient was hypertensive in the office at 191/83.  She states this is down from where she was last week.  She reports she is extremely stressed.  She was encouraged to follow-up with her cardiologist to get meds titrated.  I offered to retake the blood pressure and she refused.      Diagnoses and all orders for this visit:    1. Migraine with aura and without status migrainosus, not intractable  -     SUMAtriptan (IMITREX) 100 MG tablet; Take 1 tab at onset of Migraine. May repeat dose one time after 2 hours if Migraine not stopped.  Max 2/24 or 3 days a week.  Dispense: 12 tablet; Refill: 11  -     gabapentin (NEURONTIN) 300 MG capsule; Take 1 tab twice a day and  2  tab hs  Dispense: 120 capsule; Refill: 5    2. Nausea and vomiting, unspecified vomiting type  -     ondansetron (Zofran) 4 MG tablet; Take 2 tablets by mouth Every 12 (Twelve) Hours As Needed for Nausea or Vomiting.  Dispense: 30 tablet; Refill: 6          Return in about 1 year (around 3/4/2026), or Aurelia Rogers.     I spent 25 minutes caring for Nataliya on this date of service. This time includes time spent by me in the following activities: preparing for the visit, reviewing tests, obtaining and/or reviewing a separately obtained history, performing a medically appropriate examination and/or evaluation, counseling and educating the patient/family/caregiver, ordering medications, tests, or procedures, and documenting information in the medical record.      This document has been electronically signed by TREVON Carroll on March 4, 2025 16:43 EST

## 2025-03-04 ENCOUNTER — OFFICE VISIT (OUTPATIENT)
Dept: NEUROLOGY | Facility: CLINIC | Age: 45
End: 2025-03-04
Payer: MEDICAID

## 2025-03-04 VITALS
BODY MASS INDEX: 49.28 KG/M2 | HEART RATE: 79 BPM | DIASTOLIC BLOOD PRESSURE: 83 MMHG | SYSTOLIC BLOOD PRESSURE: 191 MMHG | WEIGHT: 261 LBS | HEIGHT: 61 IN

## 2025-03-04 DIAGNOSIS — R11.2 NAUSEA AND VOMITING, UNSPECIFIED VOMITING TYPE: ICD-10-CM

## 2025-03-04 DIAGNOSIS — G43.109 MIGRAINE WITH AURA AND WITHOUT STATUS MIGRAINOSUS, NOT INTRACTABLE: ICD-10-CM

## 2025-03-04 PROCEDURE — 3079F DIAST BP 80-89 MM HG: CPT | Performed by: NURSE PRACTITIONER

## 2025-03-04 PROCEDURE — 1160F RVW MEDS BY RX/DR IN RCRD: CPT | Performed by: NURSE PRACTITIONER

## 2025-03-04 PROCEDURE — 1159F MED LIST DOCD IN RCRD: CPT | Performed by: NURSE PRACTITIONER

## 2025-03-04 PROCEDURE — 99213 OFFICE O/P EST LOW 20 MIN: CPT | Performed by: NURSE PRACTITIONER

## 2025-03-04 PROCEDURE — 3077F SYST BP >= 140 MM HG: CPT | Performed by: NURSE PRACTITIONER

## 2025-03-04 RX ORDER — AMLODIPINE BESYLATE 5 MG/1
1 TABLET ORAL DAILY
COMMUNITY
Start: 2025-02-24 | End: 2025-03-26

## 2025-03-04 RX ORDER — HYDROXYZINE PAMOATE 25 MG/1
1 CAPSULE ORAL
COMMUNITY

## 2025-03-04 RX ORDER — SUMATRIPTAN SUCCINATE 100 MG/1
TABLET ORAL
Qty: 12 TABLET | Refills: 11 | Status: SHIPPED | OUTPATIENT
Start: 2025-03-04

## 2025-03-04 RX ORDER — ONDANSETRON 4 MG/1
8 TABLET, FILM COATED ORAL EVERY 12 HOURS PRN
Qty: 30 TABLET | Refills: 6 | Status: SHIPPED | OUTPATIENT
Start: 2025-03-04 | End: 2026-03-04

## 2025-03-04 RX ORDER — ERGOCALCIFEROL 1.25 MG/1
1 CAPSULE, LIQUID FILLED ORAL WEEKLY
COMMUNITY
Start: 2024-12-19

## 2025-03-04 RX ORDER — GABAPENTIN 300 MG/1
CAPSULE ORAL
Qty: 120 CAPSULE | Refills: 5 | Status: SHIPPED | OUTPATIENT
Start: 2025-03-04

## 2025-06-23 ENCOUNTER — HOSPITAL ENCOUNTER (EMERGENCY)
Facility: HOSPITAL | Age: 45
Discharge: HOME | End: 2025-06-23
Attending: EMERGENCY MEDICINE | Admitting: EMERGENCY MEDICINE
Payer: OTHER GOVERNMENT

## 2025-06-23 ENCOUNTER — APPOINTMENT (OUTPATIENT)
Dept: CT IMAGING | Facility: HOSPITAL | Age: 45
End: 2025-06-23
Payer: OTHER GOVERNMENT

## 2025-06-23 ENCOUNTER — APPOINTMENT (OUTPATIENT)
Dept: GENERAL RADIOLOGY | Facility: HOSPITAL | Age: 45
End: 2025-06-23
Payer: OTHER GOVERNMENT

## 2025-06-23 VITALS
WEIGHT: 264.33 LBS | RESPIRATION RATE: 18 BRPM | OXYGEN SATURATION: 97 % | HEIGHT: 61 IN | BODY MASS INDEX: 49.91 KG/M2 | DIASTOLIC BLOOD PRESSURE: 93 MMHG | TEMPERATURE: 98 F | SYSTOLIC BLOOD PRESSURE: 184 MMHG | HEART RATE: 76 BPM

## 2025-06-23 DIAGNOSIS — I10 HYPERTENSION, UNSPECIFIED TYPE: ICD-10-CM

## 2025-06-23 DIAGNOSIS — R07.9 CHEST PAIN, UNSPECIFIED TYPE: Primary | ICD-10-CM

## 2025-06-23 LAB
ALBUMIN SERPL-MCNC: 4 G/DL (ref 3.5–5.2)
ALBUMIN/GLOB SERPL: 1.2 G/DL
ALP SERPL-CCNC: 90 U/L (ref 39–117)
ALT SERPL W P-5'-P-CCNC: 8 U/L (ref 1–33)
ANION GAP SERPL CALCULATED.3IONS-SCNC: 12 MMOL/L (ref 5–15)
APTT PPP: 24 SECONDS (ref 22.7–35.4)
AST SERPL-CCNC: 22 U/L (ref 1–32)
BASOPHILS # BLD AUTO: 0.03 10*3/MM3 (ref 0–0.2)
BASOPHILS NFR BLD AUTO: 0.2 % (ref 0–1.5)
BILIRUB SERPL-MCNC: 0.3 MG/DL (ref 0–1.2)
BUN SERPL-MCNC: 10.8 MG/DL (ref 6–20)
BUN/CREAT SERPL: 15.9 (ref 7–25)
CALCIUM SPEC-SCNC: 9.4 MG/DL (ref 8.6–10.5)
CHLORIDE SERPL-SCNC: 102 MMOL/L (ref 98–107)
CO2 SERPL-SCNC: 22 MMOL/L (ref 22–29)
CREAT SERPL-MCNC: 0.68 MG/DL (ref 0.57–1)
DEPRECATED RDW RBC AUTO: 42.7 FL (ref 37–54)
EGFRCR SERPLBLD CKD-EPI 2021: 108.9 ML/MIN/1.73
EOSINOPHIL # BLD AUTO: 0.09 10*3/MM3 (ref 0–0.4)
EOSINOPHIL NFR BLD AUTO: 0.6 % (ref 0.3–6.2)
ERYTHROCYTE [DISTWIDTH] IN BLOOD BY AUTOMATED COUNT: 13.7 % (ref 12.3–15.4)
GEN 5 1HR TROPONIN T REFLEX: 7 NG/L
GLOBULIN UR ELPH-MCNC: 3.3 GM/DL
GLUCOSE SERPL-MCNC: 110 MG/DL (ref 65–99)
HCT VFR BLD AUTO: 38.5 % (ref 34–46.6)
HGB BLD-MCNC: 12.6 G/DL (ref 12–15.9)
IMM GRANULOCYTES # BLD AUTO: 0.05 10*3/MM3 (ref 0–0.05)
IMM GRANULOCYTES NFR BLD AUTO: 0.3 % (ref 0–0.5)
INR PPP: 0.92 (ref 0.9–1.1)
LYMPHOCYTES # BLD AUTO: 2.79 10*3/MM3 (ref 0.7–3.1)
LYMPHOCYTES NFR BLD AUTO: 19 % (ref 19.6–45.3)
MCH RBC QN AUTO: 27.9 PG (ref 26.6–33)
MCHC RBC AUTO-ENTMCNC: 32.7 G/DL (ref 31.5–35.7)
MCV RBC AUTO: 85.4 FL (ref 79–97)
MONOCYTES # BLD AUTO: 0.7 10*3/MM3 (ref 0.1–0.9)
MONOCYTES NFR BLD AUTO: 4.8 % (ref 5–12)
NEUTROPHILS NFR BLD AUTO: 11.04 10*3/MM3 (ref 1.7–7)
NEUTROPHILS NFR BLD AUTO: 75.1 % (ref 42.7–76)
NRBC BLD AUTO-RTO: 0 /100 WBC (ref 0–0.2)
PLATELET # BLD AUTO: 365 10*3/MM3 (ref 140–450)
PMV BLD AUTO: 10.6 FL (ref 6–12)
POTASSIUM SERPL-SCNC: 4.5 MMOL/L (ref 3.5–5.2)
PROT SERPL-MCNC: 7.3 G/DL (ref 6–8.5)
PROTHROMBIN TIME: 12.2 SECONDS (ref 11.7–14.2)
RBC # BLD AUTO: 4.51 10*6/MM3 (ref 3.77–5.28)
SODIUM SERPL-SCNC: 136 MMOL/L (ref 136–145)
TROPONIN T NUMERIC DELTA: 1 NG/L
TROPONIN T SERPL HS-MCNC: 6 NG/L
WBC NRBC COR # BLD AUTO: 14.7 10*3/MM3 (ref 3.4–10.8)

## 2025-06-23 PROCEDURE — 85610 PROTHROMBIN TIME: CPT

## 2025-06-23 PROCEDURE — 93005 ELECTROCARDIOGRAM TRACING: CPT | Performed by: EMERGENCY MEDICINE

## 2025-06-23 PROCEDURE — 25010000002 ONDANSETRON PER 1 MG

## 2025-06-23 PROCEDURE — 85025 COMPLETE CBC W/AUTO DIFF WBC: CPT

## 2025-06-23 PROCEDURE — 96375 TX/PRO/DX INJ NEW DRUG ADDON: CPT

## 2025-06-23 PROCEDURE — 25010000002 MORPHINE PER 10 MG

## 2025-06-23 PROCEDURE — 93005 ELECTROCARDIOGRAM TRACING: CPT

## 2025-06-23 PROCEDURE — 99284 EMERGENCY DEPT VISIT MOD MDM: CPT

## 2025-06-23 PROCEDURE — 71045 X-RAY EXAM CHEST 1 VIEW: CPT

## 2025-06-23 PROCEDURE — 85730 THROMBOPLASTIN TIME PARTIAL: CPT

## 2025-06-23 PROCEDURE — 70450 CT HEAD/BRAIN W/O DYE: CPT

## 2025-06-23 PROCEDURE — 84484 ASSAY OF TROPONIN QUANT: CPT

## 2025-06-23 PROCEDURE — 96374 THER/PROPH/DIAG INJ IV PUSH: CPT

## 2025-06-23 PROCEDURE — 80053 COMPREHEN METABOLIC PANEL: CPT

## 2025-06-23 PROCEDURE — 36415 COLL VENOUS BLD VENIPUNCTURE: CPT

## 2025-06-23 RX ORDER — SODIUM CHLORIDE 0.9 % (FLUSH) 0.9 %
10 SYRINGE (ML) INJECTION AS NEEDED
Status: DISCONTINUED | OUTPATIENT
Start: 2025-06-23 | End: 2025-06-23 | Stop reason: HOSPADM

## 2025-06-23 RX ORDER — ONDANSETRON 2 MG/ML
4 INJECTION INTRAMUSCULAR; INTRAVENOUS ONCE
Status: COMPLETED | OUTPATIENT
Start: 2025-06-23 | End: 2025-06-23

## 2025-06-23 RX ORDER — ASPIRIN 81 MG/1
324 TABLET, CHEWABLE ORAL ONCE
Status: DISCONTINUED | OUTPATIENT
Start: 2025-06-23 | End: 2025-06-23

## 2025-06-23 RX ADMIN — MORPHINE SULFATE 4 MG: 4 INJECTION, SOLUTION INTRAMUSCULAR; INTRAVENOUS at 10:34

## 2025-06-23 RX ADMIN — ONDANSETRON 4 MG: 2 INJECTION, SOLUTION INTRAMUSCULAR; INTRAVENOUS at 10:34

## 2025-06-23 NOTE — ED PROVIDER NOTES
Subjective   History of Present Illness  Chief Complaint: Chest pain, hypertension      HPI: Patient is a 45-year-old female who presents by private vehicle she was being seen at the [facility] clinic this morning when she was told she had elevated blood pressure they gave her a dose of clonidine at approximately 830 patient states that she is on 3 blood pressure medications, can only recall amlodipine and triamterene/HCTZ.  She has had chest pain dizziness and nausea for approaching 2 weeks as well as a headache right-sided, states that the headache can be improved with Tylenol though it does not resolve completely.  Describes the chest pain as tightness.  Does not have establish care with a cardiologist states that her blood pressures are typically in the 160s to 180s.    PCP: Lv    History provided by:  Patient      Review of Systems  See above as noted     Past Medical History:   Diagnosis Date    Cluster headache     Have had for a long time.    CTS (carpal tunnel syndrome) 08/23    Mild case    Headache, tension-type     For a long time    Hypertension     Migraine     For a long time    Sleep apnea 2005       Allergies   Allergen Reactions    Lisinopril Unknown - High Severity    Topamax [Topiramate] Other (See Comments)     Marked prior history of kidney stones.  Topamax is contraindicated due to possibility of causing kidney stones.       Past Surgical History:   Procedure Laterality Date    HYSTERECTOMY  10/20/2020       Family History   Problem Relation Age of Onset    Hypertension Mother     Migraines Mother        Social History     Socioeconomic History    Marital status:    Tobacco Use    Smoking status: Never    Smokeless tobacco: Never   Substance and Sexual Activity    Alcohol use: Not Currently     Comment: once a year     Drug use: Never    Sexual activity: Yes     Partners: Male     Birth control/protection: Hysterectomy           Objective   Physical Exam  Vitals reviewed.  "  Constitutional:       Appearance: She is obese. She is not toxic-appearing.   HENT:      Head: Normocephalic.   Eyes:      Pupils: Pupils are equal, round, and reactive to light.      Comments: Wears glasses, last eye exam was 1 week ago.   Cardiovascular:      Rate and Rhythm: Normal rate.      Pulses: Normal pulses.   Pulmonary:      Effort: Pulmonary effort is normal.   Musculoskeletal:         General: Normal range of motion.   Skin:     General: Skin is warm.   Neurological:      General: No focal deficit present.      Mental Status: She is alert and oriented to person, place, and time.         Procedures  EKG obtained reviewed and interpreted with Dr. Barrios sinus rhythm with a rate of 75, no ST elevation or ectopy noted compared to previous that was obtained 2/17/2024 sinus rhythm with a rate of 68.  No acute abnormality on todays EKG           ED Course  ED Course as of 06/23/25 1155   Mon Jun 23, 2025   1034 WBC(!): 14.70  Chronic, improved from most recent.  []   1147 Updated patient regarding initial ED findings, plan for observation. Patient would like to talk to her spouse regarding plan.  []   1152 155/89 []      ED Course User Index  [] Gianna Marquez APRN      BP (!) 181/89   Pulse 81   Temp 98 °F (36.7 °C) (Oral)   Resp 18   Ht 154.9 cm (61\")   Wt 120 kg (264 lb 5.3 oz)   LMP 09/11/2019 (Exact Date)   SpO2 98%   BMI 49.94 kg/m²   Labs Reviewed   COMPREHENSIVE METABOLIC PANEL - Abnormal; Notable for the following components:       Result Value    Glucose 110 (*)     All other components within normal limits    Narrative:     GFR Categories in Chronic Kidney Disease (CKD)              GFR Category          GFR (mL/min/1.73)    Interpretation  G1                    90 or greater        Normal or high (1)  G2                    60-89                Mild decrease (1)  G3a                   45-59                Mild to moderate decrease  G3b                   30-44                " Moderate to severe decrease  G4                    15-29                Severe decrease  G5                    14 or less           Kidney failure    (1)In the absence of evidence of kidney disease, neither GFR category G1 or G2 fulfill the criteria for CKD.    eGFR calculation 2021 CKD-EPI creatinine equation, which does not include race as a factor   CBC WITH AUTO DIFFERENTIAL - Abnormal; Notable for the following components:    WBC 14.70 (*)     Lymphocyte % 19.0 (*)     Monocyte % 4.8 (*)     Neutrophils, Absolute 11.04 (*)     All other components within normal limits   PROTIME-INR - Normal   APTT - Normal   TROPONIN - Normal    Narrative:     High Sensitive Troponin T Reference Range:  <14.0 ng/L- Negative Female for AMI  <22.0 ng/L- Negative Male for AMI  >=14 - Abnormal Female indicating possible myocardial injury.  >=22 - Abnormal Male indicating possible myocardial injury.   Clinicians would have to utilize clinical acumen, EKG, Troponin, and serial changes to determine if it is an Acute Myocardial Infarction or myocardial injury due to an underlying chronic condition.        HIGH SENSITIVITIY TROPONIN T 1HR - Normal    Narrative:     High Sensitive Troponin T Reference Range:  <14.0 ng/L- Negative Female for AMI  <22.0 ng/L- Negative Male for AMI  >=14 - Abnormal Female indicating possible myocardial injury.  >=22 - Abnormal Male indicating possible myocardial injury.   Clinicians would have to utilize clinical acumen, EKG, Troponin, and serial changes to determine if it is an Acute Myocardial Infarction or myocardial injury due to an underlying chronic condition.        CBC AND DIFFERENTIAL    Narrative:     The following orders were created for panel order CBC & Differential.  Procedure                               Abnormality         Status                     ---------                               -----------         ------                     CBC Auto Differential[[id number]]        Abnormal             Final result                 Please view results for these tests on the individual orders.     Medications   sodium chloride 0.9 % flush 10 mL (has no administration in time range)   morphine injection 4 mg (4 mg Intravenous Given 6/23/25 1034)   ondansetron (ZOFRAN) injection 4 mg (4 mg Intravenous Given 6/23/25 1034)     XR Chest 1 View  Result Date: 6/23/2025  Impression: No acute cardiopulmonary findings. Electronically Signed: Kennedy Orr MD  6/23/2025 10:02 AM EDT  Workstation ID: [id number]    CT Head Without Contrast  Result Date: 6/23/2025  Impression: No acute intracranial abnormality identified. Electronically Signed: Leo Fermin MD  6/23/2025 10:01 AM EDT  Workstation ID: [id number]                HEART Score: 3   Shared Decision Making  I discussed the findings with the patient/patient representative who is in agreement with the treatment plan and the final disposition.  Risks and benefits of discharge and/or observation/admission were discussed: Yes                                      Medical Decision Making  Patient presents with complaints, noted physical exam was completed she was placed on a cardiac monitor and an IV was established labs are obtained and essentially unremarkable initial troponin is 6 with a delta of 7.  Patient's blood pressure improved throughout her stay with most recent at 155/89.  Offered her placement in ED observation for continued monitoring with cardiology consult to which patient declined stating that she would like to go home and follow-up outpatient.  We discussed worrisome symptoms to monitor for and when to return to the ED we also discussed her blood pressure medications.  Patient gave verbal understanding of importance of returning for new or worsening symptoms, no further questions or complaints at time of discharge.    Chart review:  9/10/2021 echocardiogram  · Left ventricular ejection fraction appears to be 61 - 65%.  · No pericardial effusion  noted  · Technically difficult study         Labs: see ED course, reviewed     Radiology: Imaging reviewed by me and interpreted by radiologist.    Medications Medications  sodium chloride 0.9 % flush 10 mL (has no administration in time range)    Part of this note may be an electronic transcription/translation of spoken language to printed text using the Dragon Dictation System.    Appropriate PPE worn during exam.      Note Disclaimer: At [facility], we believe that sharing information builds trust and better  relationships. You are receiving this note because you recently visited [facility]. It is possible you will see health information before a provider has talked with you about it. This kind of information can be easy to misunderstand. To help you fully understand what it means for your health, we urge you to discuss this note with your provider.      Problems Addressed:  Chest pain, unspecified type: complicated acute illness or injury  Hypertension, unspecified type: complicated acute illness or injury    Amount and/or Complexity of Data Reviewed  Labs: ordered. Decision-making details documented in ED Course.  Radiology: ordered.  ECG/medicine tests: ordered.    Risk  OTC drugs.  Prescription drug management.        Final diagnoses:   Chest pain, unspecified type   Hypertension, unspecified type       ED Disposition  ED Disposition       ED Disposition   Discharge    Condition   Stable    Comment   --               Brigitte Awan, APRN  [address] [facility] [place] [id number] [phone]          Piero Bar MD  [address] [place] [address]  [phone]          Enoch Calvillo MD  [address] [place] [address] [place] [address]  [phone]               Medication List      No changes were made to your prescriptions during this visit.            Gianna Marquez, APRN  06/23/25 9430

## 2025-06-24 LAB
QT INTERVAL: 368 MS
QTC INTERVAL: 410 MS

## 2025-06-30 ENCOUNTER — APPOINTMENT (OUTPATIENT)
Dept: GENERAL RADIOLOGY | Facility: HOSPITAL | Age: 45
End: 2025-06-30
Payer: OTHER GOVERNMENT

## 2025-06-30 ENCOUNTER — HOSPITAL ENCOUNTER (OUTPATIENT)
Facility: HOSPITAL | Age: 45
Setting detail: OBSERVATION
Discharge: HOME | End: 2025-07-02
Attending: EMERGENCY MEDICINE | Admitting: EMERGENCY MEDICINE
Payer: OTHER GOVERNMENT

## 2025-06-30 DIAGNOSIS — R07.89 CHEST PRESSURE: ICD-10-CM

## 2025-06-30 DIAGNOSIS — R06.00 DYSPNEA, UNSPECIFIED TYPE: Primary | ICD-10-CM

## 2025-06-30 PROBLEM — R07.9 CHEST PAIN: Status: ACTIVE | Noted: 2025-06-30

## 2025-06-30 LAB
ALBUMIN SERPL-MCNC: 4.4 G/DL (ref 3.5–5.2)
ALBUMIN/GLOB SERPL: 1.3 G/DL
ALP SERPL-CCNC: 107 U/L (ref 39–117)
ALT SERPL W P-5'-P-CCNC: 10 U/L (ref 1–33)
ANION GAP SERPL CALCULATED.3IONS-SCNC: 14 MMOL/L (ref 5–15)
APTT PPP: 24.8 SECONDS (ref 22.7–35.4)
AST SERPL-CCNC: 14 U/L (ref 1–32)
BASOPHILS # BLD AUTO: 0.04 10*3/MM3 (ref 0–0.2)
BASOPHILS NFR BLD AUTO: 0.3 % (ref 0–1.5)
BILIRUB SERPL-MCNC: 0.2 MG/DL (ref 0–1.2)
BUN SERPL-MCNC: 16.7 MG/DL (ref 6–20)
BUN/CREAT SERPL: 22.9 (ref 7–25)
CALCIUM SPEC-SCNC: 10 MG/DL (ref 8.6–10.5)
CHLORIDE SERPL-SCNC: 104 MMOL/L (ref 98–107)
CO2 SERPL-SCNC: 22 MMOL/L (ref 22–29)
CREAT SERPL-MCNC: 0.73 MG/DL (ref 0.57–1)
DEPRECATED RDW RBC AUTO: 42.3 FL (ref 37–54)
EGFRCR SERPLBLD CKD-EPI 2021: 102.9 ML/MIN/1.73
EOSINOPHIL # BLD AUTO: 0.08 10*3/MM3 (ref 0–0.4)
EOSINOPHIL NFR BLD AUTO: 0.5 % (ref 0.3–6.2)
ERYTHROCYTE [DISTWIDTH] IN BLOOD BY AUTOMATED COUNT: 13.6 % (ref 12.3–15.4)
GEN 5 1HR TROPONIN T REFLEX: 7 NG/L
GLOBULIN UR ELPH-MCNC: 3.5 GM/DL
GLUCOSE SERPL-MCNC: 104 MG/DL (ref 65–99)
HCT VFR BLD AUTO: 42.1 % (ref 34–46.6)
HGB BLD-MCNC: 13.6 G/DL (ref 12–15.9)
HOLD SPECIMEN: NORMAL
IMM GRANULOCYTES # BLD AUTO: 0.07 10*3/MM3 (ref 0–0.05)
IMM GRANULOCYTES NFR BLD AUTO: 0.4 % (ref 0–0.5)
INR PPP: 0.96 (ref 0.9–1.1)
LYMPHOCYTES # BLD AUTO: 3.31 10*3/MM3 (ref 0.7–3.1)
LYMPHOCYTES NFR BLD AUTO: 21.2 % (ref 19.6–45.3)
MCH RBC QN AUTO: 27.4 PG (ref 26.6–33)
MCHC RBC AUTO-ENTMCNC: 32.3 G/DL (ref 31.5–35.7)
MCV RBC AUTO: 84.9 FL (ref 79–97)
MONOCYTES # BLD AUTO: 0.59 10*3/MM3 (ref 0.1–0.9)
MONOCYTES NFR BLD AUTO: 3.8 % (ref 5–12)
NEUTROPHILS NFR BLD AUTO: 11.55 10*3/MM3 (ref 1.7–7)
NEUTROPHILS NFR BLD AUTO: 73.8 % (ref 42.7–76)
NRBC BLD AUTO-RTO: 0 /100 WBC (ref 0–0.2)
PLATELET # BLD AUTO: 411 10*3/MM3 (ref 140–450)
PMV BLD AUTO: 11 FL (ref 6–12)
POTASSIUM SERPL-SCNC: 3.8 MMOL/L (ref 3.5–5.2)
PROT SERPL-MCNC: 7.9 G/DL (ref 6–8.5)
PROTHROMBIN TIME: 12.7 SECONDS (ref 11.7–14.2)
RBC # BLD AUTO: 4.96 10*6/MM3 (ref 3.77–5.28)
SODIUM SERPL-SCNC: 140 MMOL/L (ref 136–145)
TROPONIN T NUMERIC DELTA: -2 NG/L
TROPONIN T SERPL HS-MCNC: 9 NG/L
WBC NRBC COR # BLD AUTO: 15.64 10*3/MM3 (ref 3.4–10.8)

## 2025-06-30 PROCEDURE — 84484 ASSAY OF TROPONIN QUANT: CPT | Performed by: NURSE PRACTITIONER

## 2025-06-30 PROCEDURE — 85025 COMPLETE CBC W/AUTO DIFF WBC: CPT | Performed by: NURSE PRACTITIONER

## 2025-06-30 PROCEDURE — 85730 THROMBOPLASTIN TIME PARTIAL: CPT | Performed by: NURSE PRACTITIONER

## 2025-06-30 PROCEDURE — 36415 COLL VENOUS BLD VENIPUNCTURE: CPT

## 2025-06-30 PROCEDURE — 99285 EMERGENCY DEPT VISIT HI MDM: CPT

## 2025-06-30 PROCEDURE — 93005 ELECTROCARDIOGRAM TRACING: CPT | Performed by: EMERGENCY MEDICINE

## 2025-06-30 PROCEDURE — G0378 HOSPITAL OBSERVATION PER HR: HCPCS

## 2025-06-30 PROCEDURE — 25010000002 MORPHINE PER 10 MG: Performed by: EMERGENCY MEDICINE

## 2025-06-30 PROCEDURE — 85610 PROTHROMBIN TIME: CPT | Performed by: NURSE PRACTITIONER

## 2025-06-30 PROCEDURE — 80053 COMPREHEN METABOLIC PANEL: CPT | Performed by: NURSE PRACTITIONER

## 2025-06-30 PROCEDURE — 25010000002 HYDRALAZINE PER 20 MG: Performed by: NURSE PRACTITIONER

## 2025-06-30 PROCEDURE — 93005 ELECTROCARDIOGRAM TRACING: CPT

## 2025-06-30 PROCEDURE — 96374 THER/PROPH/DIAG INJ IV PUSH: CPT

## 2025-06-30 PROCEDURE — 96375 TX/PRO/DX INJ NEW DRUG ADDON: CPT

## 2025-06-30 PROCEDURE — 71045 X-RAY EXAM CHEST 1 VIEW: CPT

## 2025-06-30 RX ORDER — CLONAZEPAM 0.5 MG/1
0.5 TABLET ORAL EVERY 8 HOURS PRN
COMMUNITY
Start: 2025-05-06

## 2025-06-30 RX ORDER — ASPIRIN 81 MG/1
324 TABLET, CHEWABLE ORAL ONCE
Status: COMPLETED | OUTPATIENT
Start: 2025-06-30 | End: 2025-06-30

## 2025-06-30 RX ORDER — BISACODYL 10 MG
10 SUPPOSITORY, RECTAL RECTAL DAILY PRN
Status: DISCONTINUED | OUTPATIENT
Start: 2025-06-30 | End: 2025-07-02 | Stop reason: HOSPADM

## 2025-06-30 RX ORDER — BISACODYL 5 MG/1
5 TABLET, DELAYED RELEASE ORAL DAILY PRN
Status: DISCONTINUED | OUTPATIENT
Start: 2025-06-30 | End: 2025-07-02 | Stop reason: HOSPADM

## 2025-06-30 RX ORDER — NITROGLYCERIN 0.4 MG/1
0.4 TABLET SUBLINGUAL
Status: DISCONTINUED | OUTPATIENT
Start: 2025-06-30 | End: 2025-07-02 | Stop reason: HOSPADM

## 2025-06-30 RX ORDER — SODIUM CHLORIDE 9 MG/ML
40 INJECTION, SOLUTION INTRAVENOUS AS NEEDED
Status: DISCONTINUED | OUTPATIENT
Start: 2025-06-30 | End: 2025-07-02 | Stop reason: HOSPADM

## 2025-06-30 RX ORDER — CETIRIZINE HYDROCHLORIDE 10 MG/1
10 TABLET ORAL DAILY
COMMUNITY

## 2025-06-30 RX ORDER — MORPHINE SULFATE 2 MG/ML
1 INJECTION, SOLUTION INTRAMUSCULAR; INTRAVENOUS EVERY 4 HOURS PRN
Status: DISCONTINUED | OUTPATIENT
Start: 2025-06-30 | End: 2025-07-02 | Stop reason: HOSPADM

## 2025-06-30 RX ORDER — NALOXONE HCL 0.4 MG/ML
0.4 VIAL (ML) INJECTION
Status: DISCONTINUED | OUTPATIENT
Start: 2025-06-30 | End: 2025-07-02 | Stop reason: HOSPADM

## 2025-06-30 RX ORDER — SODIUM CHLORIDE 0.9 % (FLUSH) 0.9 %
10 SYRINGE (ML) INJECTION EVERY 12 HOURS SCHEDULED
Status: DISCONTINUED | OUTPATIENT
Start: 2025-06-30 | End: 2025-07-02 | Stop reason: HOSPADM

## 2025-06-30 RX ORDER — AMLODIPINE BESYLATE 5 MG/1
5 TABLET ORAL ONCE
Status: COMPLETED | OUTPATIENT
Start: 2025-06-30 | End: 2025-06-30

## 2025-06-30 RX ORDER — AMOXICILLIN 250 MG
2 CAPSULE ORAL 2 TIMES DAILY
Status: DISCONTINUED | OUTPATIENT
Start: 2025-06-30 | End: 2025-07-02 | Stop reason: HOSPADM

## 2025-06-30 RX ORDER — SODIUM CHLORIDE 0.9 % (FLUSH) 0.9 %
10 SYRINGE (ML) INJECTION AS NEEDED
Status: DISCONTINUED | OUTPATIENT
Start: 2025-06-30 | End: 2025-07-02 | Stop reason: HOSPADM

## 2025-06-30 RX ORDER — BUPROPION HYDROCHLORIDE 150 MG/1
150 TABLET ORAL ONCE
Status: COMPLETED | OUTPATIENT
Start: 2025-06-30 | End: 2025-06-30

## 2025-06-30 RX ORDER — DESVENLAFAXINE 25 MG/1
25 TABLET, EXTENDED RELEASE ORAL DAILY
COMMUNITY
Start: 2025-06-03

## 2025-06-30 RX ORDER — CLONAZEPAM 0.5 MG/1
0.5 TABLET ORAL ONCE
Status: COMPLETED | OUTPATIENT
Start: 2025-06-30 | End: 2025-06-30

## 2025-06-30 RX ORDER — GABAPENTIN 300 MG/1
600 CAPSULE ORAL ONCE
Status: COMPLETED | OUTPATIENT
Start: 2025-06-30 | End: 2025-06-30

## 2025-06-30 RX ORDER — HYDRALAZINE HYDROCHLORIDE 20 MG/ML
10 INJECTION INTRAMUSCULAR; INTRAVENOUS ONCE
Status: COMPLETED | OUTPATIENT
Start: 2025-06-30 | End: 2025-06-30

## 2025-06-30 RX ORDER — NITROGLYCERIN 0.4 MG/1
0.4 TABLET SUBLINGUAL
Status: DISCONTINUED | OUTPATIENT
Start: 2025-06-30 | End: 2025-07-01 | Stop reason: SDUPTHER

## 2025-06-30 RX ORDER — CETIRIZINE HYDROCHLORIDE 10 MG/1
10 TABLET ORAL ONCE
Status: COMPLETED | OUTPATIENT
Start: 2025-06-30 | End: 2025-06-30

## 2025-06-30 RX ORDER — DESVENLAFAXINE 25 MG/1
25 TABLET, EXTENDED RELEASE ORAL ONCE
Status: COMPLETED | OUTPATIENT
Start: 2025-06-30 | End: 2025-06-30

## 2025-06-30 RX ORDER — HYDROXYZINE HYDROCHLORIDE 25 MG/1
25 TABLET, FILM COATED ORAL ONCE
Status: COMPLETED | OUTPATIENT
Start: 2025-06-30 | End: 2025-06-30

## 2025-06-30 RX ORDER — POLYETHYLENE GLYCOL 3350 17 G/17G
17 POWDER, FOR SOLUTION ORAL DAILY PRN
Status: DISCONTINUED | OUTPATIENT
Start: 2025-06-30 | End: 2025-07-02 | Stop reason: HOSPADM

## 2025-06-30 RX ORDER — AMLODIPINE BESYLATE 5 MG/1
5 TABLET ORAL EVERY 24 HOURS
COMMUNITY
Start: 2025-02-24

## 2025-06-30 RX ADMIN — AMLODIPINE BESYLATE 5 MG: 5 TABLET ORAL at 22:13

## 2025-06-30 RX ADMIN — CLONAZEPAM 0.5 MG: 0.5 TABLET ORAL at 22:14

## 2025-06-30 RX ADMIN — MORPHINE SULFATE 1 MG: 2 INJECTION, SOLUTION INTRAMUSCULAR; INTRAVENOUS at 20:10

## 2025-06-30 RX ADMIN — GABAPENTIN 600 MG: 300 CAPSULE ORAL at 22:13

## 2025-06-30 RX ADMIN — FLUOXETINE 40 MG: 20 CAPSULE ORAL at 22:13

## 2025-06-30 RX ADMIN — DESVENLAFAXINE SUCCINATE 25 MG: 25 TABLET, EXTENDED RELEASE ORAL at 22:13

## 2025-06-30 RX ADMIN — Medication 10 ML: at 20:11

## 2025-06-30 RX ADMIN — HYDRALAZINE HYDROCHLORIDE 10 MG: 20 INJECTION INTRAMUSCULAR; INTRAVENOUS at 18:58

## 2025-06-30 RX ADMIN — CETIRIZINE HYDROCHLORIDE 10 MG: 10 TABLET, FILM COATED ORAL at 22:13

## 2025-06-30 RX ADMIN — ASPIRIN 81 MG CHEWABLE TABLET 324 MG: 81 TABLET CHEWABLE at 18:46

## 2025-06-30 RX ADMIN — BUPROPION HYDROCHLORIDE 150 MG: 150 TABLET, EXTENDED RELEASE ORAL at 22:13

## 2025-06-30 RX ADMIN — METFORMIN HYDROCHLORIDE 500 MG: 500 TABLET ORAL at 22:13

## 2025-06-30 RX ADMIN — HYDROXYZINE HYDROCHLORIDE 25 MG: 25 TABLET, FILM COATED ORAL at 22:13

## 2025-06-30 NOTE — ED PROVIDER NOTES
Subjective   History of Present Illness  Patient is a 45-year-old white female with history of hypertension who presents today with complaints of chest pressure and shortness of breath is ongoing for the last week or 2.  She was seen here in the ED a week ago and a negative ED workup and was advised observation for further cardiac evaluation but she declined and states she would follow-up.  She reports ongoing symptoms today without alleviating or exacerbating factors.  No fever chills or cough.  No leg pain or swelling.      Review of Systems   Respiratory:  Positive for shortness of breath.    Cardiovascular:  Positive for chest pain.   Neurological:  Positive for dizziness.       Past Medical History:   Diagnosis Date    Cluster headache     Have had for a long time.    CTS (carpal tunnel syndrome) 08/23    Mild case    Headache, tension-type     For a long time    Hypertension     Migraine     For a long time    Sleep apnea 2005       Allergies   Allergen Reactions    Lisinopril Unknown - High Severity    Topamax [Topiramate] Other (See Comments)     Marked prior history of kidney stones.  Topamax is contraindicated due to possibility of causing kidney stones.       Past Surgical History:   Procedure Laterality Date    HYSTERECTOMY  10/20/2020       Family History   Problem Relation Age of Onset    Hypertension Mother     Migraines Mother        Social History     Socioeconomic History    Marital status:    Tobacco Use    Smoking status: Never    Smokeless tobacco: Never   Substance and Sexual Activity    Alcohol use: Not Currently     Comment: once a year     Drug use: Never    Sexual activity: Yes     Partners: Male     Birth control/protection: Hysterectomy           Objective   Physical Exam  Vital signs and triage nurse note reviewed.  Constitutional: Awake, alert; well-developed and well-nourished. No acute distress is noted.  Obese.  HEENT: Normocephalic, atraumatic; pupils are PERRL with intact  EOM; oropharynx is pink and moist without exudate or erythema.  No drooling or pooling of oral secretions.  Neck: Supple,  Cardiovascular: Regular rate and rhythm, normal S1-S2.  No murmur noted.  Pulmonary: Respiratory effort regular nonlabored, breath sounds clear to auscultation all fields.  Abdomen: Soft, nontender, nondistended with normoactive bowel sounds; no rebound or guarding.  Musculoskeletal: Independent range of motion of all extremities with no palpable tenderness or edema.    Skin: Flesh tone, warm, dry, intact; no erythematous or petechial rash or lesion.    Procedures           ED Course      Labs Reviewed   COMPREHENSIVE METABOLIC PANEL - Abnormal; Notable for the following components:       Result Value    Glucose 104 (*)     All other components within normal limits    Narrative:     GFR Categories in Chronic Kidney Disease (CKD)              GFR Category          GFR (mL/min/1.73)    Interpretation  G1                    90 or greater        Normal or high (1)  G2                    60-89                Mild decrease (1)  G3a                   45-59                Mild to moderate decrease  G3b                   30-44                Moderate to severe decrease  G4                    15-29                Severe decrease  G5                    14 or less           Kidney failure    (1)In the absence of evidence of kidney disease, neither GFR category G1 or G2 fulfill the criteria for CKD.    eGFR calculation 2021 CKD-EPI creatinine equation, which does not include race as a factor   CBC WITH AUTO DIFFERENTIAL - Abnormal; Notable for the following components:    WBC 15.64 (*)     Monocyte % 3.8 (*)     Neutrophils, Absolute 11.55 (*)     Lymphocytes, Absolute 3.31 (*)     Immature Grans, Absolute 0.07 (*)     All other components within normal limits   PROTIME-INR - Normal   APTT - Normal   TROPONIN - Normal    Narrative:     High Sensitive Troponin T Reference Range:  <14.0 ng/L- Negative Female  for AMI  <22.0 ng/L- Negative Male for AMI  >=14 - Abnormal Female indicating possible myocardial injury.  >=22 - Abnormal Male indicating possible myocardial injury.   Clinicians would have to utilize clinical acumen, EKG, Troponin, and serial changes to determine if it is an Acute Myocardial Infarction or myocardial injury due to an underlying chronic condition.        HIGH SENSITIVITIY TROPONIN T 1HR   CBC AND DIFFERENTIAL    Narrative:     The following orders were created for panel order CBC & Differential.  Procedure                               Abnormality         Status                     ---------                               -----------         ------                     CBC Auto Differential[[id number]]        Abnormal            Final result                 Please view results for these tests on the individual orders.   EXTRA TUBES    Narrative:     The following orders were created for panel order Extra Tubes.  Procedure                               Abnormality         Status                     ---------                               -----------         ------                     Gold Top - SST[[id number]]                                   Final result                 Please view results for these tests on the individual orders.   GOLD TOP - SST     XR Chest 1 View  Result Date: 6/30/2025  Impression: No active disease. Electronically Signed: Km Arzate MD  6/30/2025 5:44 PM EDT  Workstation ID: [id number]    Medications   sodium chloride 0.9 % flush 10 mL (has no administration in time range)   nitroglycerin (NITROSTAT) SL tablet 0.4 mg (has no administration in time range)   nitroglycerin (NITROSTAT) SL tablet 0.4 mg (has no administration in time range)   sodium chloride 0.9 % flush 10 mL (has no administration in time range)   sodium chloride 0.9 % flush 10 mL (has no administration in time range)   sodium chloride 0.9 % infusion 40 mL (has no administration in time range)   nitroglycerin  (NITROSTAT) SL tablet 0.4 mg (has no administration in time range)   morphine injection 1 mg (has no administration in time range)     And   naloxone (NARCAN) injection 0.4 mg (has no administration in time range)   sennosides-docusate (PERICOLACE) 8.6-50 MG per tablet 2 tablet (has no administration in time range)     And   polyethylene glycol (MIRALAX) packet 17 g (has no administration in time range)     And   bisacodyl (DULCOLAX) EC tablet 5 mg (has no administration in time range)     And   bisacodyl (DULCOLAX) suppository 10 mg (has no administration in time range)   aspirin chewable tablet 324 mg (324 mg Oral Given 6/30/25 1846)   hydrALAZINE (APRESOLINE) injection 10 mg (10 mg Intravenous Given 6/30/25 1858)                                                      Medical Decision Making  Patient presents today with above complaint.    She had above exam and evaluation.  [facility]  IV established.  Labs EKG and chest x-ray were obtained.  She was given aspirin.  She was given a dose of hydralazine for elevated blood pressure.    Workup: EKG was independently interpreted by Dr. Bowden and reviewed by myself and shows sinus rhythm with ventricular rate of  75, no acute ST or T wave changes, no ectopy.  CBC significant for WBC of 15.6 which appears similar to prior.  Metabolic panel is unremarkable.  Initial troponin is 9.  Chest x-ray independently interpreted by Dr. Bowden reviewed by myself shows no infiltrate, radiologist reports no acute process.    On reexamination patient resting quietly no distress.  No new complaints.  Findings were discussed with her.  She will be placed in observation unit for further cardiac evaluation.     Problems Addressed:  Chest pressure: complicated acute illness or injury  Dyspnea, unspecified type: complicated acute illness or injury    Amount and/or Complexity of Data Reviewed  Labs: ordered.  Radiology: ordered.    Risk  OTC drugs.  Prescription  drug management.  Decision regarding hospitalization.        Final diagnoses:   Dyspnea, unspecified type   Chest pressure       ED Disposition  ED Disposition       ED Disposition   Decision to Admit    Condition   --    Comment   --               No follow-up provider specified.       Medication List      No changes were made to your prescriptions during this visit.            Jackie Arzola, APRN  06/30/25 8772

## 2025-07-01 ENCOUNTER — APPOINTMENT (OUTPATIENT)
Dept: NUCLEAR MEDICINE | Facility: HOSPITAL | Age: 45
End: 2025-07-01
Payer: OTHER GOVERNMENT

## 2025-07-01 ENCOUNTER — APPOINTMENT (OUTPATIENT)
Dept: CARDIOLOGY | Facility: HOSPITAL | Age: 45
End: 2025-07-01
Payer: OTHER GOVERNMENT

## 2025-07-01 LAB
AORTIC DIMENSIONLESS INDEX: 0.79 (DI)
AV MEAN PRESS GRAD SYS DOP V1V2: 5 MMHG
AV VMAX SYS DOP: 149 CM/SEC
BH CV ECHO LEFT VENTRICLE GLOBAL LONGITUDINAL STRAIN: -16.8 %
BH CV ECHO MEAS - ACS: 1.7 CM
BH CV ECHO MEAS - AO MAX PG: 8.9 MMHG
BH CV ECHO MEAS - AO V2 VTI: 27.4 CM
BH CV ECHO MEAS - AVA(I,D): 2.6 CM2
BH CV ECHO MEAS - EDV(CUBED): 54.9 ML
BH CV ECHO MEAS - EDV(MOD-SP2): 76.1 ML
BH CV ECHO MEAS - EDV(MOD-SP4): 98.4 ML
BH CV ECHO MEAS - EF(MOD-SP2): 50.9 %
BH CV ECHO MEAS - EF(MOD-SP4): 50.4 %
BH CV ECHO MEAS - ESV(CUBED): 22 ML
BH CV ECHO MEAS - ESV(MOD-SP2): 37.4 ML
BH CV ECHO MEAS - ESV(MOD-SP4): 48.8 ML
BH CV ECHO MEAS - FS: 26.3 %
BH CV ECHO MEAS - IVS/LVPW: 1 CM
BH CV ECHO MEAS - IVSD: 0.9 CM
BH CV ECHO MEAS - LA DIMENSION: 3.5 CM
BH CV ECHO MEAS - LAT PEAK E' VEL: 11.2 CM/SEC
BH CV ECHO MEAS - LV DIASTOLIC VOL/BSA (35-75): 46.5 CM2
BH CV ECHO MEAS - LV MASS(C)D: 101.1 GRAMS
BH CV ECHO MEAS - LV MAX PG: 5.6 MMHG
BH CV ECHO MEAS - LV MEAN PG: 3 MMHG
BH CV ECHO MEAS - LV SYSTOLIC VOL/BSA (12-30): 23.1 CM2
BH CV ECHO MEAS - LV V1 MAX: 118 CM/SEC
BH CV ECHO MEAS - LV V1 VTI: 20.3 CM
BH CV ECHO MEAS - LVIDD: 3.8 CM
BH CV ECHO MEAS - LVIDS: 2.8 CM
BH CV ECHO MEAS - LVOT AREA: 3.5 CM2
BH CV ECHO MEAS - LVOT DIAM: 2.1 CM
BH CV ECHO MEAS - LVPWD: 0.9 CM
BH CV ECHO MEAS - MED PEAK E' VEL: 7.7 CM/SEC
BH CV ECHO MEAS - MV MAX PG: 4.2 MMHG
BH CV ECHO MEAS - MV MEAN PG: 2 MMHG
BH CV ECHO MEAS - MV V2 VTI: 17.7 CM
BH CV ECHO MEAS - MVA(VTI): 4 CM2
BH CV ECHO MEAS - PA ACC TIME: 0.1 SEC
BH CV ECHO MEAS - PA V2 MAX: 146 CM/SEC
BH CV ECHO MEAS - RAP SYSTOLE: 3 MMHG
BH CV ECHO MEAS - RV MAX PG: 5.9 MMHG
BH CV ECHO MEAS - RV V1 MAX: 121 CM/SEC
BH CV ECHO MEAS - RV V1 VTI: 20.7 CM
BH CV ECHO MEAS - SV(LVOT): 70.3 ML
BH CV ECHO MEAS - SV(MOD-SP2): 38.7 ML
BH CV ECHO MEAS - SV(MOD-SP4): 49.6 ML
BH CV ECHO MEAS - SVI(LVOT): 33.2 ML/M2
BH CV ECHO MEAS - SVI(MOD-SP2): 18.3 ML/M2
BH CV ECHO MEAS - SVI(MOD-SP4): 23.4 ML/M2
BH CV ECHO MEAS - TAPSE (>1.6): 1.69 CM
BH CV REST NUCLEAR ISOTOPE DOSE: 11 MCI
BH CV STRESS BP STAGE 1: NORMAL
BH CV STRESS BP STAGE 2: NORMAL
BH CV STRESS COMMENTS STAGE 1: NORMAL
BH CV STRESS COMMENTS STAGE 2: NORMAL
BH CV STRESS DOSE REGADENOSON STAGE 1: 0.4
BH CV STRESS DURATION MIN STAGE 1: 0
BH CV STRESS DURATION MIN STAGE 2: 4
BH CV STRESS DURATION SEC STAGE 1: 10
BH CV STRESS DURATION SEC STAGE 2: 0
BH CV STRESS HR STAGE 1: 95
BH CV STRESS HR STAGE 2: 112
BH CV STRESS NUCLEAR ISOTOPE DOSE: 32.2 MCI
BH CV STRESS PROTOCOL 1: NORMAL
BH CV STRESS RECOVERY BP: NORMAL MMHG
BH CV STRESS RECOVERY HR: 99 BPM
BH CV STRESS STAGE 1: 1
BH CV STRESS STAGE 2: 2
BH CV XLRA - TDI S': 14.7 CM/SEC
LEFT ATRIUM VOLUME INDEX: 24.1 ML/M2
LV EF BIPLANE MOD: 50.4 %
MAXIMAL PREDICTED HEART RATE: 174 BPM
PERCENT MAX PREDICTED HR: 71.26 %
QT INTERVAL: 325 MS
QT INTERVAL: 348 MS
QTC INTERVAL: 423 MS
QTC INTERVAL: 433 MS
SINUS: 3 CM
SPECT HRT GATED+EF W RNC IV: 62 %
STRESS BASELINE BP: NORMAL MMHG
STRESS BASELINE HR: 95 BPM
STRESS PERCENT HR: 84 %
STRESS POST PEAK BP: NORMAL MMHG
STRESS POST PEAK HR: 124 BPM
STRESS TARGET HR: 148 BPM

## 2025-07-01 PROCEDURE — 78452 HT MUSCLE IMAGE SPECT MULT: CPT

## 2025-07-01 PROCEDURE — 93306 TTE W/DOPPLER COMPLETE: CPT

## 2025-07-01 PROCEDURE — 78452 HT MUSCLE IMAGE SPECT MULT: CPT | Performed by: INTERNAL MEDICINE

## 2025-07-01 PROCEDURE — 93017 CV STRESS TEST TRACING ONLY: CPT

## 2025-07-01 PROCEDURE — G0378 HOSPITAL OBSERVATION PER HR: HCPCS

## 2025-07-01 PROCEDURE — 93005 ELECTROCARDIOGRAM TRACING: CPT | Performed by: EMERGENCY MEDICINE

## 2025-07-01 PROCEDURE — 93356 MYOCRD STRAIN IMG SPCKL TRCK: CPT

## 2025-07-01 PROCEDURE — 96376 TX/PRO/DX INJ SAME DRUG ADON: CPT

## 2025-07-01 PROCEDURE — 93306 TTE W/DOPPLER COMPLETE: CPT | Performed by: INTERNAL MEDICINE

## 2025-07-01 PROCEDURE — 93010 ELECTROCARDIOGRAM REPORT: CPT | Performed by: INTERNAL MEDICINE

## 2025-07-01 PROCEDURE — 93356 MYOCRD STRAIN IMG SPCKL TRCK: CPT | Performed by: INTERNAL MEDICINE

## 2025-07-01 PROCEDURE — 34310000005 TECHNETIUM TETROFOSMIN KIT: Performed by: EMERGENCY MEDICINE

## 2025-07-01 PROCEDURE — 93018 CV STRESS TEST I&R ONLY: CPT | Performed by: INTERNAL MEDICINE

## 2025-07-01 PROCEDURE — 25010000002 HYDRALAZINE PER 20 MG: Performed by: NURSE PRACTITIONER

## 2025-07-01 PROCEDURE — A9502 TC99M TETROFOSMIN: HCPCS | Performed by: EMERGENCY MEDICINE

## 2025-07-01 PROCEDURE — 25010000002 REGADENOSON 0.4 MG/5ML SOLUTION: Performed by: EMERGENCY MEDICINE

## 2025-07-01 RX ORDER — DESVENLAFAXINE 25 MG/1
25 TABLET, EXTENDED RELEASE ORAL DAILY
Status: DISCONTINUED | OUTPATIENT
Start: 2025-07-02 | End: 2025-07-02 | Stop reason: HOSPADM

## 2025-07-01 RX ORDER — IBUPROFEN 400 MG/1
400 TABLET, FILM COATED ORAL ONCE
Status: COMPLETED | OUTPATIENT
Start: 2025-07-01 | End: 2025-07-01

## 2025-07-01 RX ORDER — BUPROPION HYDROCHLORIDE 150 MG/1
150 TABLET ORAL DAILY
Status: DISCONTINUED | OUTPATIENT
Start: 2025-07-01 | End: 2025-07-01

## 2025-07-01 RX ORDER — GABAPENTIN 300 MG/1
300 CAPSULE ORAL NIGHTLY
Status: DISCONTINUED | OUTPATIENT
Start: 2025-07-01 | End: 2025-07-02 | Stop reason: HOSPADM

## 2025-07-01 RX ORDER — METOPROLOL SUCCINATE 50 MG/1
50 TABLET, EXTENDED RELEASE ORAL
Status: DISCONTINUED | OUTPATIENT
Start: 2025-07-02 | End: 2025-07-02 | Stop reason: HOSPADM

## 2025-07-01 RX ORDER — METOPROLOL SUCCINATE 25 MG/1
25 TABLET, EXTENDED RELEASE ORAL DAILY
Status: DISCONTINUED | OUTPATIENT
Start: 2025-07-01 | End: 2025-07-01

## 2025-07-01 RX ORDER — METOPROLOL SUCCINATE 25 MG/1
25 TABLET, EXTENDED RELEASE ORAL EVERY 12 HOURS SCHEDULED
Status: DISCONTINUED | OUTPATIENT
Start: 2025-07-01 | End: 2025-07-01

## 2025-07-01 RX ORDER — GABAPENTIN 300 MG/1
600 CAPSULE ORAL
COMMUNITY

## 2025-07-01 RX ORDER — AMLODIPINE BESYLATE 5 MG/1
5 TABLET ORAL EVERY 24 HOURS
Status: DISCONTINUED | OUTPATIENT
Start: 2025-07-01 | End: 2025-07-01

## 2025-07-01 RX ORDER — REGADENOSON 0.08 MG/ML
0.4 INJECTION, SOLUTION INTRAVENOUS
Status: COMPLETED | OUTPATIENT
Start: 2025-07-01 | End: 2025-07-01

## 2025-07-01 RX ORDER — AMLODIPINE BESYLATE 5 MG/1
10 TABLET ORAL DAILY
Status: DISCONTINUED | OUTPATIENT
Start: 2025-07-01 | End: 2025-07-02 | Stop reason: HOSPADM

## 2025-07-01 RX ORDER — CLONAZEPAM 0.5 MG/1
0.5 TABLET ORAL EVERY 8 HOURS PRN
Status: DISCONTINUED | OUTPATIENT
Start: 2025-07-01 | End: 2025-07-02 | Stop reason: HOSPADM

## 2025-07-01 RX ORDER — HYDROXYZINE HYDROCHLORIDE 25 MG/1
25 TABLET, FILM COATED ORAL 3 TIMES DAILY PRN
Status: DISCONTINUED | OUTPATIENT
Start: 2025-07-01 | End: 2025-07-02 | Stop reason: HOSPADM

## 2025-07-01 RX ORDER — CLONIDINE HYDROCHLORIDE 0.1 MG/1
0.1 TABLET ORAL ONCE
Status: COMPLETED | OUTPATIENT
Start: 2025-07-01 | End: 2025-07-01

## 2025-07-01 RX ORDER — HYDRALAZINE HYDROCHLORIDE 20 MG/ML
10 INJECTION INTRAMUSCULAR; INTRAVENOUS EVERY 6 HOURS PRN
Status: DISCONTINUED | OUTPATIENT
Start: 2025-07-01 | End: 2025-07-02 | Stop reason: HOSPADM

## 2025-07-01 RX ORDER — BUTALBITAL, ACETAMINOPHEN AND CAFFEINE 50; 325; 40 MG/1; MG/1; MG/1
1 TABLET ORAL EVERY 4 HOURS PRN
Status: DISCONTINUED | OUTPATIENT
Start: 2025-07-01 | End: 2025-07-02 | Stop reason: HOSPADM

## 2025-07-01 RX ORDER — CETIRIZINE HYDROCHLORIDE 10 MG/1
10 TABLET ORAL DAILY
Status: DISCONTINUED | OUTPATIENT
Start: 2025-07-01 | End: 2025-07-02 | Stop reason: HOSPADM

## 2025-07-01 RX ORDER — ACETAMINOPHEN 325 MG/1
650 TABLET ORAL EVERY 6 HOURS PRN
Status: DISCONTINUED | OUTPATIENT
Start: 2025-07-01 | End: 2025-07-02 | Stop reason: HOSPADM

## 2025-07-01 RX ORDER — GABAPENTIN 300 MG/1
300 CAPSULE ORAL 2 TIMES DAILY
COMMUNITY

## 2025-07-01 RX ADMIN — BUTALBITAL, ACETAMINOPHEN, AND CAFFEINE 1 TABLET: 325; 50; 40 TABLET ORAL at 11:32

## 2025-07-01 RX ADMIN — BUPROPION HYDROCHLORIDE 150 MG: 150 TABLET, EXTENDED RELEASE ORAL at 09:11

## 2025-07-01 RX ADMIN — Medication 10 ML: at 09:12

## 2025-07-01 RX ADMIN — CLONIDINE HYDROCHLORIDE 0.1 MG: 0.1 TABLET ORAL at 11:23

## 2025-07-01 RX ADMIN — ACETAMINOPHEN 650 MG: 325 TABLET, FILM COATED ORAL at 09:17

## 2025-07-01 RX ADMIN — METOPROLOL SUCCINATE 25 MG: 25 TABLET, EXTENDED RELEASE ORAL at 09:11

## 2025-07-01 RX ADMIN — AMLODIPINE BESYLATE 10 MG: 5 TABLET ORAL at 09:11

## 2025-07-01 RX ADMIN — FLUOXETINE 40 MG: 20 CAPSULE ORAL at 09:11

## 2025-07-01 RX ADMIN — HYDRALAZINE HYDROCHLORIDE 10 MG: 20 INJECTION INTRAMUSCULAR; INTRAVENOUS at 09:53

## 2025-07-01 RX ADMIN — SENNOSIDES AND DOCUSATE SODIUM 2 TABLET: 50; 8.6 TABLET ORAL at 09:12

## 2025-07-01 RX ADMIN — ACETAMINOPHEN 650 MG: 325 TABLET, FILM COATED ORAL at 20:15

## 2025-07-01 RX ADMIN — CETIRIZINE HYDROCHLORIDE 10 MG: 10 TABLET, FILM COATED ORAL at 09:11

## 2025-07-01 RX ADMIN — Medication 10 ML: at 20:15

## 2025-07-01 RX ADMIN — IBUPROFEN 400 MG: 400 TABLET, FILM COATED ORAL at 04:44

## 2025-07-01 RX ADMIN — REGADENOSON 0.4 MG: 0.08 INJECTION, SOLUTION INTRAVENOUS at 08:04

## 2025-07-01 RX ADMIN — TETROFOSMIN 1 DOSE: 1.38 INJECTION, POWDER, LYOPHILIZED, FOR SOLUTION INTRAVENOUS at 08:04

## 2025-07-01 RX ADMIN — TETROFOSMIN 1 DOSE: 1.38 INJECTION, POWDER, LYOPHILIZED, FOR SOLUTION INTRAVENOUS at 06:48

## 2025-07-01 RX ADMIN — GABAPENTIN 300 MG: 300 CAPSULE ORAL at 20:15

## 2025-07-01 NOTE — PLAN OF CARE
Goal Outcome Evaluation:      AOX4, c/o chest/head pain 7/10, prn pain medication given which helped. NPO after midnight for echo and myoview.

## 2025-07-01 NOTE — CASE MANAGEMENT/SOCIAL WORK
Continued Stay Note  [facility]     Patient Name: Nataliya Wesley  MRN: [id number]  Today's Date: 7/1/2025    Admit Date: 6/30/2025    Plan: Needs CM interview   Discharge Plan       Row Name 07/01/25 1451       Plan    Plan Needs CM interview    Plan Comments Barriers: Hypertension with med changes. CM needs to interview and do SDOH. Attempted twice and she was on her cell phone                      Lyndsay LANDRY,RN Case Manager  [facility]  Phone: Desk- [phone]  Cell- [phone]

## 2025-07-01 NOTE — H&P
[facility] H&P    Patient Name: Nataliya Wesley  : 1980  MRN: [id number]  Primary Care Physician: Brigitte Awan APRN  Date of admission: 2025     Patient Care Team:  Brigitte Awan APRN as PCP - General (Nurse Practitioner)  Enoch Calvillo MD as Consulting Physician (Cardiology)          Subjective   History Present Illness     Chief Complaint:   Chief Complaint   Patient presents with    Chest Pain     Chest pain     Chest Pain   Associated symptoms include headaches and shortness of breath. Pertinent negatives include no diaphoresis.     ED 2025  Patient is a 45-year-old white female with history of hypertension who presents today with complaints of chest pressure and shortness of breath is ongoing for the last week or 2.  She was seen here in the ED a week ago and a negative ED workup and was advised observation for further cardiac evaluation but she declined and states she would follow-up.  She reports ongoing symptoms today without alleviating or exacerbating factors.  No fever chills or cough.  No leg pain or swelling.    Observation 2025  Patient agrees with HPI noted above including intermittent chest pain but she clarifies that it has been about a month but has got gradually worse.  She describes as an elephant sitting on her chest.  She states that her blood pressure is typically high at home ranging 200s to 120s.  She complains of headache currently.    Review of Systems   Constitutional: Negative for diaphoresis.   Cardiovascular:  Positive for chest pain. Negative for leg swelling.   Respiratory:  Positive for shortness of breath.    Neurological:  Positive for headaches.   All other systems reviewed and are negative.          Personal History     Past Medical History:   Past Medical History:   Diagnosis Date    Cluster headache     Have had for a long time.    CTS (carpal tunnel syndrome)     Mild case    Headache, tension-type     For a long time     Hypertension     Migraine     For a long time    Sleep apnea 2005       Surgical History:      Past Surgical History:   Procedure Laterality Date    HYSTERECTOMY  10/20/2020           Family History: family history includes Hypertension in her mother; Migraines in her mother. Otherwise pertinent FHx was reviewed and unremarkable.     Social History:  reports that she has never smoked. She has never used smokeless tobacco. She reports that she does not currently use alcohol. She reports that she does not use drugs.      Medications:  Prior to Admission medications    Medication Sig Start Date End Date Taking? Authorizing Provider   Acetaminophen 500 MG capsule Take 1 capsule by mouth Every 6 (Six) Hours As Needed for Mild Pain.   Yes Nahed Dumont MD   amLODIPine (NORVASC) 5 MG tablet Take 1 tablet by mouth Daily. 2/24/25  Yes Nahed Dumont MD   cetirizine (ZyrTEC Allergy) 10 MG tablet Take 1 tablet by mouth Daily.   Yes Nahed Dumont MD   clonazePAM (KlonoPIN) 0.5 MG tablet Take 1 tablet by mouth Every 8 (Eight) Hours As Needed. 5/6/25  Yes Nahed Dumont MD   Desvenlafaxine Succinate ER 25 MG tablet sustained-release 24 hour Take 1 tablet by mouth Daily. 6/3/25  Yes Nahed Dumont MD   gabapentin (NEURONTIN) 300 MG capsule Take 1 capsule by mouth 2 (Two) Times a Day. 1 tab bid and 2 tab qhs.   Yes Nahed Dumont MD   gabapentin (NEURONTIN) 300 MG capsule Take 2 capsules by mouth every night at bedtime. 1 tab bid and 2 tab qhs.   Yes Nahed Dumont MD   metFORMIN (GLUCOPHAGE) 500 MG tablet Take 1 tablet by mouth Daily.   Yes Nahed Dumont MD   metoprolol succinate XL (TOPROL-XL) 25 MG 24 hr tablet Take 1 tablet by mouth once daily 8/31/21  Yes Enoch Calvillo MD   ondansetron (Zofran) 4 MG tablet Take 2 tablets by mouth Every 12 (Twelve) Hours As Needed for Nausea or Vomiting. 3/4/25 3/4/26 Yes Aurelia Rogers APRN   SUMAtriptan (IMITREX) 100 MG  tablet Take 1 tab at onset of Migraine. May repeat dose one time after 2 hours if Migraine not stopped.  Max 2/24 or 3 days a week. 3/4/25  Yes Aurelia Rogers APRN   vitamin D (ERGOCALCIFEROL) 1.25 MG (88085 UT) capsule capsule Take 1 capsule by mouth 1 (One) Time Per Week. Mondays 12/19/24  Yes Nahed Dumont MD   buPROPion XL (WELLBUTRIN XL) 150 MG 24 hr tablet Take 1 tablet by mouth Every Morning.  7/1/25 Yes Nahed Dumont MD   diclofenac (VOLTAREN) 75 MG EC tablet Take 1 tablet by mouth 2 (Two) Times a Day. 8/2/24 7/1/25 Yes Aleksey Vasquez MD   FLUoxetine (PROzac) 40 MG capsule Take 1 capsule by mouth Daily. 9/28/20 7/1/25 Yes Nahed Dumont MD   gabapentin (NEURONTIN) 300 MG capsule Take 1 tab twice a day and  2 tab hs 3/4/25 7/1/25 Yes Aurelia Rogers APRN   hydrOXYzine pamoate (VISTARIL) 25 MG capsule 1 capsule.  7/1/25 Yes Nahed Dumont MD   promethazine (PHENERGAN) 25 MG tablet Take 1 tablet by mouth Every 6 (Six) Hours As Needed for Nausea or Vomiting. 8/2/24 7/1/25 Yes Aleksey Vasquez MD       Allergies:    Allergies   Allergen Reactions    Lisinopril Unknown - High Severity    Topamax [Topiramate] Other (See Comments)     Marked prior history of kidney stones.  Topamax is contraindicated due to possibility of causing kidney stones.       Objective   Objective     Vital Signs  Temp:  [97.4 °F (36.3 °C)-98.1 °F (36.7 °C)] 97.9 °F (36.6 °C)  Heart Rate:  [] 109  Resp:  [18-20] 18  BP: (134-196)/() 186/98  SpO2:  [95 %-99 %] 97 %  on   ;   Device (Oxygen Therapy): room air  Body mass index is 49.32 kg/m².    Physical Exam  Vitals and nursing note reviewed.   Constitutional:       Appearance: Normal appearance. She is obese.   HENT:      Head: Normocephalic and atraumatic.      Right Ear: External ear normal.      Left Ear: External ear normal.      Nose: Nose normal.      Mouth/Throat:      Mouth: Mucous membranes are moist.      Pharynx: Oropharynx  is clear.   Eyes:      Extraocular Movements: Extraocular movements intact.   Cardiovascular:      Rate and Rhythm: Normal rate and regular rhythm.      Pulses: Normal pulses.      Heart sounds: Normal heart sounds.   Pulmonary:      Effort: Pulmonary effort is normal.      Breath sounds: Normal breath sounds.   Abdominal:      General: Abdomen is flat. Bowel sounds are normal.      Palpations: Abdomen is soft.   Musculoskeletal:         General: Normal range of motion.      Cervical back: Normal range of motion.   Skin:     General: Skin is warm.   Neurological:      General: No focal deficit present.      Mental Status: She is alert and oriented to person, place, and time.   Psychiatric:         Mood and Affect: Mood normal.         Behavior: Behavior normal.           Results Review:  I have personally reviewed most recent cardiac tracings, lab results, and radiology images and interpretations and agree with findings, most notably: CBC, BMP, chest x-ray, EKG.    Results from last 7 days   Lab Units 06/30/25  1722   WBC 10*3/mm3 15.64*   HEMOGLOBIN g/dL 13.6   HEMATOCRIT % 42.1   PLATELETS 10*3/mm3 411   INR  0.96     Results from last 7 days   Lab Units 06/30/25  1854 06/30/25  1722   SODIUM mmol/L  --  140   POTASSIUM mmol/L  --  3.8   CHLORIDE mmol/L  --  104   CO2 mmol/L  --  22.0   BUN mg/dL  --  16.7   CREATININE mg/dL  --  0.73   GLUCOSE mg/dL  --  104*   CALCIUM mg/dL  --  10.0   ALK PHOS U/L  --  107   ALT (SGPT) U/L  --  10   AST (SGOT) U/L  --  14   HSTROP T ng/L 7 9     Estimated Creatinine Clearance: 115.4 mL/min (by C-G formula based on SCr of 0.73 mg/dL).  Brief Urine Lab Results  (Last result in the past 365 days)        Color   Clarity   Blood   Leuk Est   Nitrite   Protein   CREAT   Urine HCG        08/02/24 1048 Yellow   Slightly Cloudy   Large (3+)   Negative   Negative   Trace                   Microbiology Results (last 10 days)       ** No results found for the last 240 hours. **             ECG/EMG Results (most recent)       Procedure Component Value Units Date/Time    Telemetry Scan [[id number]] Resulted: 06/30/25 2054     Updated: 07/01/25 0049    Telemetry Scan [[id number]] Resulted: 06/30/25 2359     Updated: 07/01/25 0049    Telemetry Scan [[id number]] Resulted: 07/01/25 0348     Updated: 07/01/25 0508    ECG 12 Lead Chest Pain [[id number]] Collected: 06/30/25 1633     Updated: 07/01/25 0650     QT Interval 348 ms      QTC Interval 423 ms     Narrative:      HEART RATE=89  bpm  RR Ftoofamn=083  ms  MO Upuadtaf=304  ms  P Horizontal Axis=5  deg  P Front Axis=62  deg  QRSD Interval=77  ms  QT Ewayrynr=333  ms  BViS=445  ms  QRS Axis=9  deg  T Wave Axis=66  deg  - OTHERWISE NORMAL ECG -  Sinus rhythm  Ventricular premature complex  When compared with ECG of 23-Jun-2025 09:23:25,  No significant change  Electronically Signed By: Cedric Bowden ([facility]) 2025-07-01 06:48:43  Date and Time of Study:2025-06-30 16:33:35    Telemetry Scan [[id number]] Resulted: 07/01/25 0710     Updated: 07/01/25 0718    ECG 12 Lead Chest Pain [[id number]] Collected: 07/01/25 0527     Updated: 07/01/25 0758     QT Interval 325 ms      QTC Interval 433 ms     Narrative:      HEART GFXW=934  bpm  RR Opjalqhd=785  ms  MO Uvmdxmdl=695  ms  P Horizontal Axis=51  deg  P Front Axis=61  deg  QRSD Interval=75  ms  QT Epnuezac=119  ms  AGlI=684  ms  QRS Axis=18  deg  T Wave Axis=59  deg  - ABNORMAL ECG -  Sinus tachycardia  Inferior  infarct, old  When compared with ECG of 30-Jun-2025 16:33:35,  No significant change  Electronically Signed By: Shaheed Becerra ([facility]) 2025-07-01 07:57:02  Date and Time of Study:2025-07-01 05:27:13    Adult Transthoracic Echo Complete w/ Color, Spectral and Contrast if Necessary Per Protocol [[id number]] Resulted: 07/01/25 0807     Updated: 07/01/25 0807     EF(MOD-bp) 50.4 %      LV GLOBAL STRAIN  -16.8 %      LVIDd 3.8 cm      LVIDs 2.8 cm      IVSd 0.90 cm      LVPWd 0.90 cm      FS 26.3 %      IVS/LVPW 1.00  cm      ESV(cubed) 22.0 ml      LV Sys Vol (BSA corrected) 23.1 cm2      EDV(cubed) 54.9 ml      LV Lao Vol (BSA corrected) 46.5 cm2      LV mass(C)d 101.1 grams      LVOT area 3.5 cm2      LVOT diam 2.10 cm      EDV(MOD-sp2) 76.1 ml      EDV(MOD-sp4) 98.4 ml      ESV(MOD-sp2) 37.4 ml      ESV(MOD-sp4) 48.8 ml      SV(MOD-sp2) 38.7 ml      SV(MOD-sp4) 49.6 ml      SVi(MOD-SP2) 18.3 ml/m2      SVi(MOD-SP4) 23.4 ml/m2      SVi (LVOT) 33.2 ml/m2      EF(MOD-sp2) 50.9 %      EF(MOD-sp4) 50.4 %      LA ESV Index (BP) 24.1 ml/m2      Med Peak E' [place] 7.7 cm/sec      Lat Peak E' [place] 11.2 cm/sec      SV(LVOT) 70.3 ml      TAPSE (>1.6) 1.69 cm      RV S' 14.7 cm/sec      LA dimension (2D)  3.5 cm      LV V1 max 118.0 cm/sec      LV V1 max PG 5.6 mmHg      LV V1 mean PG 3.0 mmHg      LV V1 VTI 20.3 cm      Ao pk salvador 149.0 cm/sec      Ao max PG 8.9 mmHg      Ao mean PG 5.0 mmHg      Ao V2 VTI 27.4 cm      KENYA(I,D) 2.6 cm2      Dimensionless Index 0.79 (DI)      MV max PG 4.2 mmHg      MV mean PG 2.00 mmHg      MV V2 VTI 17.7 cm      MVA(VTI) 4.0 cm2      RAP systole 3.0 mmHg      RV V1 max PG 5.9 mmHg      RV V1 max 121.0 cm/sec      RV V1 VTI 20.7 cm      PA V2 max 146.0 cm/sec      PA acc time 0.10 sec      ACS 1.70 cm      Sinus 3.0 cm     Narrative:        Left ventricular systolic function is normal. Calculated left   ventricular EF = 50.4%    Left ventricular diastolic function was normal.  Average GLS -16.8%.    Estimated right ventricular systolic pressure from tricuspid   regurgitation is normal (<35 mmHg).    No significant valvular abnormalities noted.      Telemetry Scan [[id number]] Resulted: 07/01/25 1103     Updated: 07/01/25 1121                Results for orders placed during the hospital encounter of 06/30/25    Adult Transthoracic Echo Complete w/ Color, Spectral and Contrast if Necessary Per Protocol    Interpretation Summary    Left ventricular systolic function is normal. Calculated left ventricular EF =  50.4%    Left ventricular diastolic function was normal.  Average GLS -16.8%.    Estimated right ventricular systolic pressure from tricuspid regurgitation is normal (<35 mmHg).    No significant valvular abnormalities noted.      XR Chest 1 View  Result Date: 6/30/2025  Impression: No active disease. Electronically Signed: Km Arzate MD  6/30/2025 5:44 PM EDT  Workstation ID: [id number]        Estimated Creatinine Clearance: 115.4 mL/min (by C-G formula based on SCr of 0.73 mg/dL).    Assessment & Plan   Assessment/Plan       Active Hospital Problems    Diagnosis  POA    **Chest pain [R07.9]  Yes      Resolved Hospital Problems   No resolved problems to display.       Chest pain  Lab Results   Component Value Date    TROPONINT 7 06/30/2025    TROPONINT 9 06/30/2025    TROPONINT 7 06/23/2025   -Troponin, CMP, CBC unremarkable  - Chest x-ray showed no active disease  - EKG showed sinus tachycardia with heart rate 106  - Telemetry  - In the ED patient given aspirin  - Stress test showed normal myocardial perfusion and no signs of ischemia  - 2D echo showed EF 50.4%  -Anticipate dc in am when BP improved    Hypertension  -Poorly Controlled   BP Readings from Last 1 Encounters:   07/01/25 (!) 186/98   -/112 and consistently high during observation  -Iv hydralazine as needed  -Patient was on norvasc 5mg daily at home and  metoprolol 25mg daily   -Increase norvas to 10 mg daily and metoprolol to 50mg daily.  -Clonidine x 1 dose  - Monitor while admitted  -Hopefully dc in am    Mood disorder  - Continue Prozac and Wellbutrin    Obesity  - BMI 49  - Lifestyle modifications      VTE Prophylaxis - Documented VTE Prophylaxis Not Indicated  Reason:        Start        06/30/25 1833  VTE Prophylaxis Not Indicated: Low Risk; No Risk Factors (0)  Once                              CODE STATUS:    Code Status and Medical Interventions: CPR (Attempt to Resuscitate); Full Support   Ordered at: 06/30/25 1833     Code Status  (Patient has no pulse and is not breathing):    CPR (Attempt to Resuscitate)     Medical Interventions (Patient has pulse or is breathing):    Full Support       This patient has been examined wearing personal protective equipment.     I discussed the patient's findings and my recommendations with patient and nursing staff.      Signature:Electronically signed by JARRELL Sierra, 07/01/25, 2:14 PM EDT.

## 2025-07-02 VITALS
WEIGHT: 261 LBS | OXYGEN SATURATION: 96 % | RESPIRATION RATE: 13 BRPM | SYSTOLIC BLOOD PRESSURE: 153 MMHG | HEART RATE: 76 BPM | HEIGHT: 61 IN | DIASTOLIC BLOOD PRESSURE: 93 MMHG | TEMPERATURE: 97.6 F | BODY MASS INDEX: 49.28 KG/M2

## 2025-07-02 LAB
ANION GAP SERPL CALCULATED.3IONS-SCNC: 10.8 MMOL/L (ref 5–15)
BASOPHILS # BLD AUTO: 0.04 10*3/MM3 (ref 0–0.2)
BASOPHILS NFR BLD AUTO: 0.3 % (ref 0–1.5)
BUN SERPL-MCNC: 21.1 MG/DL (ref 6–20)
BUN/CREAT SERPL: 27.1 (ref 7–25)
CALCIUM SPEC-SCNC: 9.8 MG/DL (ref 8.6–10.5)
CHLORIDE SERPL-SCNC: 107 MMOL/L (ref 98–107)
CO2 SERPL-SCNC: 23.2 MMOL/L (ref 22–29)
CREAT SERPL-MCNC: 0.78 MG/DL (ref 0.57–1)
DEPRECATED RDW RBC AUTO: 45.6 FL (ref 37–54)
EGFRCR SERPLBLD CKD-EPI 2021: 95 ML/MIN/1.73
EOSINOPHIL # BLD AUTO: 0.14 10*3/MM3 (ref 0–0.4)
EOSINOPHIL NFR BLD AUTO: 1.1 % (ref 0.3–6.2)
ERYTHROCYTE [DISTWIDTH] IN BLOOD BY AUTOMATED COUNT: 14.2 % (ref 12.3–15.4)
GLUCOSE SERPL-MCNC: 106 MG/DL (ref 65–99)
HCT VFR BLD AUTO: 40.6 % (ref 34–46.6)
HGB BLD-MCNC: 12.7 G/DL (ref 12–15.9)
IMM GRANULOCYTES # BLD AUTO: 0.04 10*3/MM3 (ref 0–0.05)
IMM GRANULOCYTES NFR BLD AUTO: 0.3 % (ref 0–0.5)
LYMPHOCYTES # BLD AUTO: 3.69 10*3/MM3 (ref 0.7–3.1)
LYMPHOCYTES NFR BLD AUTO: 28 % (ref 19.6–45.3)
MCH RBC QN AUTO: 27.7 PG (ref 26.6–33)
MCHC RBC AUTO-ENTMCNC: 31.3 G/DL (ref 31.5–35.7)
MCV RBC AUTO: 88.6 FL (ref 79–97)
MONOCYTES # BLD AUTO: 0.74 10*3/MM3 (ref 0.1–0.9)
MONOCYTES NFR BLD AUTO: 5.6 % (ref 5–12)
NEUTROPHILS NFR BLD AUTO: 64.7 % (ref 42.7–76)
NEUTROPHILS NFR BLD AUTO: 8.52 10*3/MM3 (ref 1.7–7)
NRBC BLD AUTO-RTO: 0 /100 WBC (ref 0–0.2)
PLATELET # BLD AUTO: 377 10*3/MM3 (ref 140–450)
PMV BLD AUTO: 11.1 FL (ref 6–12)
POTASSIUM SERPL-SCNC: 4.2 MMOL/L (ref 3.5–5.2)
RBC # BLD AUTO: 4.58 10*6/MM3 (ref 3.77–5.28)
SODIUM SERPL-SCNC: 141 MMOL/L (ref 136–145)
WBC NRBC COR # BLD AUTO: 13.17 10*3/MM3 (ref 3.4–10.8)

## 2025-07-02 PROCEDURE — 85025 COMPLETE CBC W/AUTO DIFF WBC: CPT | Performed by: NURSE PRACTITIONER

## 2025-07-02 PROCEDURE — G0378 HOSPITAL OBSERVATION PER HR: HCPCS

## 2025-07-02 PROCEDURE — 80048 BASIC METABOLIC PNL TOTAL CA: CPT | Performed by: NURSE PRACTITIONER

## 2025-07-02 RX ORDER — TRIAMTERENE AND HYDROCHLOROTHIAZIDE 37.5; 25 MG/1; MG/1
1 TABLET ORAL DAILY
Qty: 30 TABLET | Refills: 1 | Status: SHIPPED | OUTPATIENT
Start: 2025-07-02

## 2025-07-02 RX ORDER — AMLODIPINE BESYLATE 10 MG/1
10 TABLET ORAL DAILY
Qty: 30 TABLET | Refills: 0 | Status: SHIPPED | OUTPATIENT
Start: 2025-07-03

## 2025-07-02 RX ORDER — METOPROLOL SUCCINATE 50 MG/1
50 TABLET, EXTENDED RELEASE ORAL
Qty: 30 TABLET | Refills: 0 | Status: SHIPPED | OUTPATIENT
Start: 2025-07-03

## 2025-07-02 RX ORDER — TRIAMTERENE AND HYDROCHLOROTHIAZIDE 37.5; 25 MG/1; MG/1
1 TABLET ORAL DAILY
Status: DISCONTINUED | OUTPATIENT
Start: 2025-07-02 | End: 2025-07-02 | Stop reason: HOSPADM

## 2025-07-02 RX ADMIN — ACETAMINOPHEN 650 MG: 325 TABLET, FILM COATED ORAL at 08:40

## 2025-07-02 RX ADMIN — Medication 10 ML: at 08:36

## 2025-07-02 RX ADMIN — AMLODIPINE BESYLATE 10 MG: 5 TABLET ORAL at 08:36

## 2025-07-02 RX ADMIN — METOPROLOL SUCCINATE 50 MG: 50 TABLET, EXTENDED RELEASE ORAL at 08:36

## 2025-07-02 RX ADMIN — DESVENLAFAXINE SUCCINATE 25 MG: 25 TABLET, EXTENDED RELEASE ORAL at 08:35

## 2025-07-02 RX ADMIN — CETIRIZINE HYDROCHLORIDE 10 MG: 10 TABLET, FILM COATED ORAL at 08:35

## 2025-07-02 NOTE — PLAN OF CARE
Goal Outcome Evaluation:  Plan of Care Reviewed With: patient        Progress: improving  Outcome Evaluation: Plan of care is ongoing.

## 2025-07-02 NOTE — PLAN OF CARE
Goal Outcome Evaluation:  Plan of Care Reviewed With: patient        Progress: improving  Outcome Evaluation: Pt has remained stable this shift with no c/o chest pain or SOB. Pt verbalized she is eager to be discharged home. Pt anticipated to dc home and follow up outpatient.

## 2025-07-02 NOTE — DISCHARGE SUMMARY
[facility]    Brigitte Awan ZAKIYA, JARRELL    CHIEF COMPLAINT:     Chest pain    HISTORY OF PRESENT ILLNESS:    HPI    ED 06/30/2025  Patient is a 45-year-old white female with history of hypertension who presents today with complaints of chest pressure and shortness of breath is ongoing for the last week or 2.  She was seen here in the ED a week ago and a negative ED workup and was advised observation for further cardiac evaluation but she declined and states she would follow-up.  She reports ongoing symptoms today without alleviating or exacerbating factors.  No fever chills or cough.  No leg pain or swelling.    Observation 07/01/2025  Patient agrees with HPI noted above including intermittent chest pain but she clarifies that it has been about a month but has got gradually worse.  She describes as an elephant sitting on her chest.  She states that her blood pressure is typically high at home ranging 200s to 120s.  She complains of headache currently.      Past Medical History:   Diagnosis Date    Cluster headache     Have had for a long time.    CTS (carpal tunnel syndrome) 08/23    Mild case    Headache, tension-type     For a long time    Hypertension     Migraine     For a long time    Sleep apnea 2005     Past Surgical History:   Procedure Laterality Date    HYSTERECTOMY  10/20/2020     Family History   Problem Relation Age of Onset    Hypertension Mother     Migraines Mother      Social History     Tobacco Use    Smoking status: Never    Smokeless tobacco: Never   Vaping Use    Vaping status: Never Used   Substance Use Topics    Alcohol use: Not Currently     Comment: once a year     Drug use: Never     Medications Prior to Admission   Medication Sig Dispense Refill Last Dose/Taking    Acetaminophen 500 MG capsule Take 1 capsule by mouth Every 6 (Six) Hours As Needed for Mild Pain.   Taking As Needed    amLODIPine (NORVASC) 5 MG tablet Take 1 tablet by mouth Daily.   6/29/2025 Evening     cetirizine (ZyrTEC Allergy) 10 MG tablet Take 1 tablet by mouth Daily.   6/29/2025 Evening    clonazePAM (KlonoPIN) 0.5 MG tablet Take 1 tablet by mouth Every 8 (Eight) Hours As Needed.   6/29/2025 Evening    Desvenlafaxine Succinate ER 25 MG tablet sustained-release 24 hour Take 1 tablet by mouth Daily.   6/29/2025 Evening    gabapentin (NEURONTIN) 300 MG capsule Take 1 capsule by mouth 2 (Two) Times a Day. 1 tab bid and 2 tab qhs.   Taking    gabapentin (NEURONTIN) 300 MG capsule Take 2 capsules by mouth every night at bedtime. 1 tab bid and 2 tab qhs.   Taking    metFORMIN (GLUCOPHAGE) 500 MG tablet Take 1 tablet by mouth Daily.   6/29/2025 Evening    metoprolol succinate XL (TOPROL-XL) 25 MG 24 hr tablet Take 1 tablet by mouth once daily 30 tablet 0 6/29/2025 Evening    ondansetron (Zofran) 4 MG tablet Take 2 tablets by mouth Every 12 (Twelve) Hours As Needed for Nausea or Vomiting. 30 tablet 6 Past Week    SUMAtriptan (IMITREX) 100 MG tablet Take 1 tab at onset of Migraine. May repeat dose one time after 2 hours if Migraine not stopped.  Max 2/24 or 3 days a week. 12 tablet 11 Taking    vitamin D (ERGOCALCIFEROL) 1.25 MG (48236 UT) capsule capsule Take 1 capsule by mouth 1 (One) Time Per Week. Mondays   Past Week     Allergies:  Lisinopril and Topamax [topiramate]      There is no immunization history on file for this patient.        REVIEW OF SYSTEMS:    ROS    Constitutional: Negative for diaphoresis.   Cardiovascular:  Positive for chest pain. Negative for leg swelling.   Respiratory:  Positive for shortness of breath.    Neurological:  Positive for headaches.   All other systems reviewed and are negative.    Vital Signs  Temp:  [97.6 °F (36.4 °C)-98 °F (36.7 °C)] 97.6 °F (36.4 °C)  Heart Rate:  [] 76  Resp:  [13-19] 13  BP: (115-195)/() 153/93          Physical Exam:  Physical Exam    Vitals and nursing note reviewed.   Constitutional:       Appearance: Normal appearance. She is obese.   HENT:       Head: Normocephalic and atraumatic.      Right Ear: External ear normal.      Left Ear: External ear normal.      Nose: Nose normal.      Mouth/Throat:      Mouth: Mucous membranes are moist.      Pharynx: Oropharynx is clear.   Eyes:      Extraocular Movements: Extraocular movements intact.   Cardiovascular:      Rate and Rhythm: Normal rate and regular rhythm.      Pulses: Normal pulses.      Heart sounds: Normal heart sounds.   Pulmonary:      Effort: Pulmonary effort is normal.      Breath sounds: Normal breath sounds.   Abdominal:      General: Abdomen is flat. Bowel sounds are normal.      Palpations: Abdomen is soft.   Musculoskeletal:         General: Normal range of motion.      Cervical back: Normal range of motion.   Skin:     General: Skin is warm.   Neurological:      General: No focal deficit present.      Mental Status: She is alert and oriented to person, place, and time.   Psychiatric:         Mood and Affect: Mood normal.         Behavior: Behavior normal.     Emotional Behavior:    wnl   Debilities:   None      Results Review:    I reviewed the patient's new clinical results.  Lab Results (most recent)       Procedure Component Value Units Date/Time    Basic Metabolic Panel [[id number]]  (Abnormal) Collected: 07/02/25 0322    Specimen: Blood from Arm, Right Updated: 07/02/25 0500     Glucose 106 mg/dL      BUN 21.1 mg/dL      Creatinine 0.78 mg/dL      Sodium 141 mmol/L      Potassium 4.2 mmol/L      Chloride 107 mmol/L      CO2 23.2 mmol/L      Calcium 9.8 mg/dL      BUN/Creatinine Ratio 27.1     Anion Gap 10.8 mmol/L      eGFR 95.0 mL/min/1.73     Narrative:      GFR Categories in Chronic Kidney Disease (CKD)              GFR Category          GFR (mL/min/1.73)    Interpretation  G1                    90 or greater        Normal or high (1)  G2                    60-89                Mild decrease (1)  G3a                   45-59                Mild to moderate decrease  G3b                    30-44                Moderate to severe decrease  G4                    15-29                Severe decrease  G5                    14 or less           Kidney failure    (1)In the absence of evidence of kidney disease, neither GFR category G1 or G2 fulfill the criteria for CKD.    eGFR calculation 2021 CKD-EPI creatinine equation, which does not include race as a factor    CBC & Differential [[id number]]  (Abnormal) Collected: 07/02/25 0322    Specimen: Blood from Arm, Right Updated: 07/02/25 0429    Narrative:      The following orders were created for panel order CBC & Differential.  Procedure                               Abnormality         Status                     ---------                               -----------         ------                     CBC Auto Differential[[id number]]        Abnormal            Final result                 Please view results for these tests on the individual orders.    CBC Auto Differential [[id number]]  (Abnormal) Collected: 07/02/25 0322    Specimen: Blood from Arm, Right Updated: 07/02/25 0429     WBC 13.17 10*3/mm3      RBC 4.58 10*6/mm3      Hemoglobin 12.7 g/dL      Hematocrit 40.6 %      MCV 88.6 fL      MCH 27.7 pg      MCHC 31.3 g/dL      RDW 14.2 %      RDW-SD 45.6 fl      MPV 11.1 fL      Platelets 377 10*3/mm3      Neutrophil % 64.7 %      Lymphocyte % 28.0 %      Monocyte % 5.6 %      Eosinophil % 1.1 %      Basophil % 0.3 %      Immature Grans % 0.3 %      Neutrophils, Absolute 8.52 10*3/mm3      Lymphocytes, Absolute 3.69 10*3/mm3      Monocytes, Absolute 0.74 10*3/mm3      Eosinophils, Absolute 0.14 10*3/mm3      Basophils, Absolute 0.04 10*3/mm3      Immature Grans, Absolute 0.04 10*3/mm3      nRBC 0.0 /100 WBC     High Sensitivity Troponin T 1Hr [[id number]]  (Normal) Collected: 06/30/25 1854    Specimen: Blood Updated: 06/30/25 1922     HS Troponin T 7 ng/L      Troponin T Numeric Delta -2 ng/L     Narrative:      High Sensitive Troponin T Reference  Range:  <14.0 ng/L- Negative Female for AMI  <22.0 ng/L- Negative Male for AMI  >=14 - Abnormal Female indicating possible myocardial injury.  >=22 - Abnormal Male indicating possible myocardial injury.   Clinicians would have to utilize clinical acumen, EKG, Troponin, and serial changes to determine if it is an Acute Myocardial Infarction or myocardial injury due to an underlying chronic condition.         Comprehensive Metabolic Panel [[id number]]  (Abnormal) Collected: 06/30/25 1722    Specimen: Blood Updated: 06/30/25 1752     Glucose 104 mg/dL      BUN 16.7 mg/dL      Creatinine 0.73 mg/dL      Sodium 140 mmol/L      Potassium 3.8 mmol/L      Chloride 104 mmol/L      CO2 22.0 mmol/L      Calcium 10.0 mg/dL      Total Protein 7.9 g/dL      Albumin 4.4 g/dL      ALT (SGPT) 10 U/L      AST (SGOT) 14 U/L      Alkaline Phosphatase 107 U/L      Total Bilirubin 0.2 mg/dL      Globulin 3.5 gm/dL      A/G Ratio 1.3 g/dL      BUN/Creatinine Ratio 22.9     Anion Gap 14.0 mmol/L      eGFR 102.9 mL/min/1.73     Narrative:      GFR Categories in Chronic Kidney Disease (CKD)              GFR Category          GFR (mL/min/1.73)    Interpretation  G1                    90 or greater        Normal or high (1)  G2                    60-89                Mild decrease (1)  G3a                   45-59                Mild to moderate decrease  G3b                   30-44                Moderate to severe decrease  G4                    15-29                Severe decrease  G5                    14 or less           Kidney failure    (1)In the absence of evidence of kidney disease, neither GFR category G1 or G2 fulfill the criteria for CKD.    eGFR calculation 2021 CKD-EPI creatinine equation, which does not include race as a factor    High Sensitivity Troponin T [[id number]]  (Normal) Collected: 06/30/25 1722    Specimen: Blood Updated: 06/30/25 1752     HS Troponin T 9 ng/L     Narrative:      High Sensitive Troponin T Reference  Range:  <14.0 ng/L- Negative Female for AMI  <22.0 ng/L- Negative Male for AMI  >=14 - Abnormal Female indicating possible myocardial injury.  >=22 - Abnormal Male indicating possible myocardial injury.   Clinicians would have to utilize clinical acumen, EKG, Troponin, and serial changes to determine if it is an Acute Myocardial Infarction or myocardial injury due to an underlying chronic condition.         Protime-INR [[id number]]  (Normal) Collected: 06/30/25 1722    Specimen: Blood Updated: 06/30/25 1739     Protime 12.7 Seconds      INR 0.96    aPTT [[id number]]  (Normal) Collected: 06/30/25 1722    Specimen: Blood Updated: 06/30/25 1739     PTT 24.8 seconds     Extra Tubes [[id number]] Collected: 06/30/25 1722    Specimen: Blood, Venous Line Updated: 06/30/25 1731    Narrative:      The following orders were created for panel order Extra Tubes.  Procedure                               Abnormality         Status                     ---------                               -----------         ------                     Gold Top - SST[[id number]]                                   Final result                 Please view results for these tests on the individual orders.    Gold Top - SST [[id number]] Collected: 06/30/25 1722    Specimen: Blood Updated: 06/30/25 1731     Extra Tube Hold for add-ons.     Comment: Auto resulted.       CBC & Differential [[id number]]  (Abnormal) Collected: 06/30/25 1722    Specimen: Blood Updated: 06/30/25 1730    Narrative:      The following orders were created for panel order CBC & Differential.  Procedure                               Abnormality         Status                     ---------                               -----------         ------                     CBC Auto Differential[[id number]]        Abnormal            Final result                 Please view results for these tests on the individual orders.    CBC Auto Differential [[id number]]  (Abnormal) Collected: 06/30/25 1722     Specimen: Blood Updated: 06/30/25 1730     WBC 15.64 10*3/mm3      RBC 4.96 10*6/mm3      Hemoglobin 13.6 g/dL      Hematocrit 42.1 %      MCV 84.9 fL      MCH 27.4 pg      MCHC 32.3 g/dL      RDW 13.6 %      RDW-SD 42.3 fl      MPV 11.0 fL      Platelets 411 10*3/mm3      Neutrophil % 73.8 %      Lymphocyte % 21.2 %      Monocyte % 3.8 %      Eosinophil % 0.5 %      Basophil % 0.3 %      Immature Grans % 0.4 %      Neutrophils, Absolute 11.55 10*3/mm3      Lymphocytes, Absolute 3.31 10*3/mm3      Monocytes, Absolute 0.59 10*3/mm3      Eosinophils, Absolute 0.08 10*3/mm3      Basophils, Absolute 0.04 10*3/mm3      Immature Grans, Absolute 0.07 10*3/mm3      nRBC 0.0 /100 WBC             Imaging Results (Most Recent)       Procedure Component Value Units Date/Time    XR Chest 1 View [[id number]] Collected: 06/30/25 1744     Updated: 06/30/25 1746    Narrative:      XR CHEST 1 VW    Date of Exam: 6/30/2025 5:38 PM EDT    Indication: cp    Comparison: 6/23/2025    Findings:  Cardiomediastinal silhouette is unremarkable.  No airspace disease, pneumothorax, nor pleural effusion. No acute osseous abnormality identified.      Impression:      Impression:  No active disease.        Electronically Signed: Km Arzate MD    6/30/2025 5:44 PM EDT    Workstation ID: [id number]          reviewed    ECG/EMG Results (most recent)       Procedure Component Value Units Date/Time    Telemetry Scan [[id number]] Resulted: 06/30/25 2054     Updated: 07/01/25 0049    Telemetry Scan [[id number]] Resulted: 06/30/25 2359     Updated: 07/01/25 0049    Telemetry Scan [[id number]] Resulted: 07/01/25 0348     Updated: 07/01/25 0508    ECG 12 Lead Chest Pain [[id number]] Collected: 06/30/25 1633     Updated: 07/01/25 0650     QT Interval 348 ms      QTC Interval 423 ms     Narrative:      HEART RATE=89  bpm  RR Jotjszvr=417  ms  CA Ipiwdkrs=526  ms  P Horizontal Axis=5  deg  P Front Axis=62  deg  QRSD Interval=77  ms  QT Tykavbww=793  ms  MPoR=321   ms  QRS Axis=9  deg  T Wave Axis=66  deg  - OTHERWISE NORMAL ECG -  Sinus rhythm  Ventricular premature complex  When compared with ECG of 23-Jun-2025 09:23:25,  No significant change  Electronically Signed By: Cedric Bowden ([facility]) 2025-07-01 06:48:43  Date and Time of Study:2025-06-30 16:33:35    Telemetry Scan [[id number]] Resulted: 07/01/25 0710     Updated: 07/01/25 0718    ECG 12 Lead Chest Pain [[id number]] Collected: 07/01/25 0527     Updated: 07/01/25 0758     QT Interval 325 ms      QTC Interval 433 ms     Narrative:      HEART QMWP=448  bpm  RR Dgqiqpui=004  ms  OK Neburwcu=912  ms  P Horizontal Axis=51  deg  P Front Axis=61  deg  QRSD Interval=75  ms  QT Swaaxtmz=489  ms  DPkL=917  ms  QRS Axis=18  deg  T Wave Axis=59  deg  - ABNORMAL ECG -  Sinus tachycardia  Inferior  infarct, old  When compared with ECG of 30-Jun-2025 16:33:35,  No significant change  Electronically Signed By: Shaheed Becerra ([facility]) 2025-07-01 07:57:02  Date and Time of Study:2025-07-01 05:27:13    Adult Transthoracic Echo Complete w/ Color, Spectral and Contrast if Necessary Per Protocol [[id number]] Resulted: 07/01/25 0807     Updated: 07/01/25 0807     EF(MOD-bp) 50.4 %      LV GLOBAL STRAIN  -16.8 %      LVIDd 3.8 cm      LVIDs 2.8 cm      IVSd 0.90 cm      LVPWd 0.90 cm      FS 26.3 %      IVS/LVPW 1.00 cm      ESV(cubed) 22.0 ml      LV Sys Vol (BSA corrected) 23.1 cm2      EDV(cubed) 54.9 ml      LV Lao Vol (BSA corrected) 46.5 cm2      LV mass(C)d 101.1 grams      LVOT area 3.5 cm2      LVOT diam 2.10 cm      EDV(MOD-sp2) 76.1 ml      EDV(MOD-sp4) 98.4 ml      ESV(MOD-sp2) 37.4 ml      ESV(MOD-sp4) 48.8 ml      SV(MOD-sp2) 38.7 ml      SV(MOD-sp4) 49.6 ml      SVi(MOD-SP2) 18.3 ml/m2      SVi(MOD-SP4) 23.4 ml/m2      SVi (LVOT) 33.2 ml/m2      EF(MOD-sp2) 50.9 %      EF(MOD-sp4) 50.4 %      LA ESV Index (BP) 24.1 ml/m2      Med Peak E' [place] 7.7 cm/sec      Lat Peak E' [place] 11.2 cm/sec      SV(LVOT) 70.3 ml      TAPSE (>1.6) 1.69 cm       RV S' 14.7 cm/sec      LA dimension (2D)  3.5 cm      LV V1 max 118.0 cm/sec      LV V1 max PG 5.6 mmHg      LV V1 mean PG 3.0 mmHg      LV V1 VTI 20.3 cm      Ao pk guillermo 149.0 cm/sec      Ao max PG 8.9 mmHg      Ao mean PG 5.0 mmHg      Ao V2 VTI 27.4 cm      KENYA(I,D) 2.6 cm2      Dimensionless Index 0.79 (DI)      MV max PG 4.2 mmHg      MV mean PG 2.00 mmHg      MV V2 VTI 17.7 cm      MVA(VTI) 4.0 cm2      RAP systole 3.0 mmHg      RV V1 max PG 5.9 mmHg      RV V1 max 121.0 cm/sec      RV V1 VTI 20.7 cm      PA V2 max 146.0 cm/sec      PA acc time 0.10 sec      ACS 1.70 cm      Sinus 3.0 cm     Narrative:        Left ventricular systolic function is normal. Calculated left   ventricular EF = 50.4%    Left ventricular diastolic function was normal.  Average GLS -16.8%.    Estimated right ventricular systolic pressure from tricuspid   regurgitation is normal (<35 mmHg).    No significant valvular abnormalities noted.      Telemetry Scan [[id number]] Resulted: 07/01/25 1103     Updated: 07/01/25 1121    Telemetry Scan [[id number]] Resulted: 07/01/25 1548     Updated: 07/01/25 1611    Telemetry Scan [[id number]] Resulted: 07/01/25 2014     Updated: 07/01/25 2049    Telemetry Scan [[id number]] Resulted: 07/01/25 2331     Updated: 07/01/25 2349    Telemetry Scan [[id number]] Resulted: 07/02/25 0350     Updated: 07/02/25 0419    Telemetry Scan [[id number]] Resulted: 07/02/25 0708     Updated: 07/02/25 0719          reviewed        Results for orders placed during the hospital encounter of 06/30/25    Adult Transthoracic Echo Complete w/ Color, Spectral and Contrast if Necessary Per Protocol    Interpretation Summary    Left ventricular systolic function is normal. Calculated left ventricular EF = 50.4%    Left ventricular diastolic function was normal.  Average GLS -16.8%.    Estimated right ventricular systolic pressure from tricuspid regurgitation is normal (<35 mmHg).    No significant valvular abnormalities  noted.      Microbiology Results (last 10 days)       ** No results found for the last 240 hours. **            Assessment & Plan     Chest pain     Chest pain        Lab Results   Component Value Date     TROPONINT 7 06/30/2025     TROPONINT 9 06/30/2025     TROPONINT 7 06/23/2025   -Troponin, CMP, CBC unremarkable  - Chest x-ray showed no active disease  - EKG showed sinus tachycardia with heart rate 106  - Telemetry  - In the ED patient given aspirin  - Stress test showed normal myocardial perfusion and no signs of ischemia  - 2D echo showed EF 50.4%  - follow up as previously scheduled with dr rivera     Hypertension  -Poorly Controlled       BP Readings from Last 1 Encounters:   07/01/25 (!) 186/98   -home meds norvasc 5, metoprolol 35, maxide 37.5-25  - /112 and consistently high during observation  -Iv hydralazine as needed  -Patient was on norvasc 5mg daily at home and  metoprolol 25mg daily   -Increase norvasc to 10 mg daily and metoprolol to 50mg daily.  -Clonidine x 1 dose  - Monitor while admitted  - bp [id number]/83     Mood disorder  - Continue Prozac and Wellbutrin     Obesity  - BMI 49  - Lifestyle modifications    I discussed the patients findings and my recommendations with patient and nursing staff.     Discharge Diagnosis:      Chest pain      Hospital Course  Patient is a 45 y.o. female presented with chest pain and high bp. Er evaluated and admitted to observation. Labs including cbc, cmp, troponin, are normal. Chest xray is normal. EKG rate 106 sinus. Stress test was low risk for ischemia. Echo wa unremarkable. Pt has follow up with dr rivera next month. Bp was high, pt on 3 bp meds at home. Increased dosage to norvasc and metoprolol and bps are better. Discharge discussed with pt and she is agreeable to plan. Instructed pt to return to er if symptoms reoccur or worsen.      Past Medical History:     Past Medical History:   Diagnosis Date    Cluster headache     Have had for a long time.     CTS (carpal tunnel syndrome) 08/23    Mild case    Headache, tension-type     For a long time    Hypertension     Migraine     For a long time    Sleep apnea 2005       Past Surgical History:     Past Surgical History:   Procedure Laterality Date    HYSTERECTOMY  10/20/2020       Social History:   Social History     Socioeconomic History    Marital status:    Tobacco Use    Smoking status: Never    Smokeless tobacco: Never   Vaping Use    Vaping status: Never Used   Substance and Sexual Activity    Alcohol use: Not Currently     Comment: once a year     Drug use: Never    Sexual activity: Yes     Partners: Male     Birth control/protection: Hysterectomy       Procedures Performed         Consults:   Consults       No orders found from 6/1/2025 to 7/1/2025.            Condition on Discharge:     Stable    Discharge Disposition  Home or Self Care    Discharge Medications     Discharge Medications        New Medications        Instructions Start Date   triamterene-hydrochlorothiazide 37.5-25 MG per tablet  Commonly known as: MAXZIDE-25   1 tablet, Oral, Daily             Changes to Medications        Instructions Start Date   amLODIPine 5 MG tablet  Commonly known as: NORVASC  What changed: Another medication with the same name was added. Make sure you understand how and when to take each.   5 mg, Every 24 Hours      amLODIPine 10 MG tablet  Commonly known as: NORVASC  What changed: You were already taking a medication with the same name, and this prescription was added. Make sure you understand how and when to take each.   10 mg, Oral, Daily   Start Date: July 3, 2025     metoprolol succinate XL 25 MG 24 hr tablet  Commonly known as: TOPROL-XL  What changed: Another medication with the same name was added. Make sure you understand how and when to take each.   Take 1 tablet by mouth once daily      metoprolol succinate XL 50 MG 24 hr tablet  Commonly known as: TOPROL-XL  What changed: You were already taking a  medication with the same name, and this prescription was added. Make sure you understand how and when to take each.   50 mg, Oral, Every 24 Hours Scheduled   Start Date: July 3, 2025            Continue These Medications        Instructions Start Date   Acetaminophen 500 MG capsule   500 mg, Every 6 Hours PRN      clonazePAM 0.5 MG tablet  Commonly known as: KlonoPIN   0.5 mg, Every 8 Hours PRN      Desvenlafaxine Succinate ER 25 MG tablet sustained-release 24 hour   25 mg, Daily      gabapentin 300 MG capsule  Commonly known as: NEURONTIN   300 mg, 2 Times Daily      gabapentin 300 MG capsule  Commonly known as: NEURONTIN   600 mg, Every Night at Bedtime      metFORMIN 500 MG tablet  Commonly known as: GLUCOPHAGE   500 mg, Oral, Daily      ondansetron 4 MG tablet  Commonly known as: Zofran   8 mg, Oral, Every 12 Hours PRN      SUMAtriptan 100 MG tablet  Commonly known as: IMITREX   Take 1 tab at onset of Migraine. May repeat dose one time after 2 hours if Migraine not stopped.  Max 2/24 or 3 days a week.      vitamin D 1.25 MG (95143 UT) capsule capsule  Commonly known as: ERGOCALCIFEROL   1 capsule, Oral, Weekly, Mondays      ZyrTEC Allergy 10 MG tablet  Generic drug: cetirizine   10 mg, Daily               Discharge Diet:     Activity at Discharge:     Follow-up Appointments  Future Appointments   Date Time Provider Department Center   7/29/2025  1:30 PM Bob Lo MD [facility]   8/20/2025  8:40 AM Seipel, Joseph F, MD [facility]   3/4/2026  4:30 PM Aurelia Rogers APRN [facility]     Additional Instructions for the Follow-ups that You Need to Schedule       Discharge Follow-up with PCP   As directed       Currently Documented PCP:    Brigitte Awan APRN    PCP Phone Number:    [phone]     Follow Up Details: 2 weeks        Discharge Follow-up with Specified Provider: Dr Lo   As directed      To: Dr Lo   Follow Up Details: as previously scheduled                 Test Results Pending at Discharge  Pending Results       None             Risk for Readmission (LACE) Score: 3 (7/2/2025  6:00 AM)      Less Than 30 minutes spent in discharge activities for this patient    Signature:Electronically signed by Aster Avendano PA-C, 07/02/25, 9:43 AM EDT.

## 2025-07-29 ENCOUNTER — OFFICE VISIT (OUTPATIENT)
Dept: CARDIOLOGY | Facility: CLINIC | Age: 45
End: 2025-07-29
Payer: OTHER GOVERNMENT

## 2025-07-29 VITALS
WEIGHT: 264 LBS | DIASTOLIC BLOOD PRESSURE: 88 MMHG | RESPIRATION RATE: 18 BRPM | OXYGEN SATURATION: 95 % | BODY MASS INDEX: 49.84 KG/M2 | HEIGHT: 61 IN | SYSTOLIC BLOOD PRESSURE: 150 MMHG | HEART RATE: 64 BPM

## 2025-07-29 DIAGNOSIS — I10 BENIGN HYPERTENSION: Primary | ICD-10-CM

## 2025-07-29 DIAGNOSIS — R06.09 DOE (DYSPNEA ON EXERTION): ICD-10-CM

## 2025-07-29 DIAGNOSIS — R07.89 OTHER CHEST PAIN: ICD-10-CM

## 2025-07-29 PROCEDURE — 3079F DIAST BP 80-89 MM HG: CPT | Performed by: INTERNAL MEDICINE

## 2025-07-29 PROCEDURE — 3077F SYST BP >= 140 MM HG: CPT | Performed by: INTERNAL MEDICINE

## 2025-07-29 PROCEDURE — 99204 OFFICE O/P NEW MOD 45 MIN: CPT | Performed by: INTERNAL MEDICINE

## 2025-07-29 RX ORDER — METOPROLOL TARTRATE 1 MG/ML
5 INJECTION, SOLUTION INTRAVENOUS
OUTPATIENT
Start: 2025-07-29

## 2025-07-29 RX ORDER — NITROGLYCERIN 0.4 MG/1
0.8 TABLET SUBLINGUAL
OUTPATIENT
Start: 2025-07-29

## 2025-07-29 RX ORDER — METOPROLOL TARTRATE 25 MG/1
200 TABLET, FILM COATED ORAL ONCE
OUTPATIENT
Start: 2025-07-29 | End: 2025-07-29

## 2025-07-29 RX ORDER — METOPROLOL TARTRATE 25 MG/1
100 TABLET, FILM COATED ORAL ONCE
OUTPATIENT
Start: 2025-07-29

## 2025-07-29 RX ORDER — CLONIDINE HYDROCHLORIDE 0.1 MG/1
TABLET ORAL
COMMUNITY
Start: 2025-07-11

## 2025-07-29 RX ORDER — SODIUM CHLORIDE 0.9 % (FLUSH) 0.9 %
10 SYRINGE (ML) INJECTION EVERY 12 HOURS SCHEDULED
OUTPATIENT
Start: 2025-07-29

## 2025-07-29 RX ORDER — NITROGLYCERIN 0.4 MG/1
0.4 TABLET SUBLINGUAL
OUTPATIENT
Start: 2025-07-29 | End: 2025-07-29

## 2025-07-29 RX ORDER — SODIUM CHLORIDE 0.9 % (FLUSH) 0.9 %
10 SYRINGE (ML) INJECTION AS NEEDED
OUTPATIENT
Start: 2025-07-29

## 2025-07-29 RX ORDER — LOSARTAN POTASSIUM 25 MG/1
25 TABLET ORAL DAILY
Qty: 90 TABLET | Refills: 1 | Status: SHIPPED | OUTPATIENT
Start: 2025-07-29

## 2025-07-29 RX ORDER — SODIUM CHLORIDE 9 MG/ML
40 INJECTION, SOLUTION INTRAVENOUS AS NEEDED
OUTPATIENT
Start: 2025-07-29

## 2025-07-29 RX ORDER — METOPROLOL TARTRATE 25 MG/1
150 TABLET, FILM COATED ORAL ONCE
OUTPATIENT
Start: 2025-07-29

## 2025-07-29 RX ORDER — METOPROLOL TARTRATE 50 MG
TABLET ORAL
Qty: 2 TABLET | Refills: 0 | Status: SHIPPED | OUTPATIENT
Start: 2025-07-29

## 2025-07-29 RX ORDER — METOPROLOL TARTRATE 25 MG/1
50 TABLET, FILM COATED ORAL ONCE
OUTPATIENT
Start: 2025-07-29

## 2025-07-29 RX ORDER — METOPROLOL TARTRATE 50 MG
50 TABLET ORAL
OUTPATIENT
Start: 2025-07-29

## 2025-07-29 NOTE — PROGRESS NOTES
"Cardiology Clinic Note  Bob Lo MD, PhD    Subjective:     Encounter Date:07/29/2025      Patient ID: Nataliya Wesley is a 45 y.o. female.    Chief Complaint:  Chief Complaint   Patient presents with    Shortness of Breath    Chest Pain       HPI:    This is a 45-year-old female who presents to clinic after an ER evaluation for refractory chest pain dyspnea on exertion feeling a squeezing and pressure on her chest constantly.  She also has headaches shortness of breath dyspnea on exertion, uncontrolled hypertension.  She had a stress test and echo revealing normal cardiac structure and function with no reversible ischemia or low risk findings.  She still complaining of crushing substernal chest pain feeling like something is sitting on her chest as well as significant exertional fatigue and being tired all the time.  She does have sleep apnea she is overweight with BMI of nearly 50.  We discussed diet exercise weight loss control of sleep apnea, we discussed coronary CT for definitive evaluation of her coronary anatomy to avoid invasive workup, she does not have a history of hyperlipidemia although she is diabetic on metformin    Review of systems otherwise negative x 14 point review of systems except as mentioned above    Historical data copied forward from previous encounters in EMR including the history, exam, and assessment/plan has been reviewed and is unchanged unless noted otherwise.    Cardiac medicines reviewed with risk, benefits, and necessity of each discussed.    Risk and benefit of cardiac testing reviewed including death heart attack stroke pain bleeding infection need for vascular /cardiovascular surgery were discussed and the patient     Objective:         /88 (BP Location: Right arm, Patient Position: Sitting)   Pulse 64   Resp 18   Ht 154.9 cm (61\")   Wt 120 kg (264 lb)   LMP 09/11/2019 (Exact Date)   SpO2 95%   BMI 49.88 kg/m²     Physical Exam  Regular rate and rhythm no " rubs murmurs or gallops  No heave no lift  No clubbing cyanosis or edema  Obese soft nontender nondistended  Intact grossly  Assessment:       Chest pain refractory to medical therapy  Normal stress and echo this year  Refractory symptoms  Get coronary CT for definitive evaluation  Start losartan 25 daily in addition to her amlodipine and metoprolol for blood pressure reduction goal less than 140 systolic, continue triamterene-HCTZ  Has lower extremity edema, decreased free water intake  Okay for weight loss medications if needed along with diet and exercise  Primary mention goals    Further recommendations to follow findings and clinical course    Comorbidities managed, hypertension, chest pain, CV risk assessment, preventative health care      The pleasure to be involved in this patient's cardiovascular care.  Please call with any questions or concerns  Bob Lo MD, PhD    Most recent EKG as reviewed and interpreted by me:  Procedures     Most recent echo as reviewed and interpreted by me:  Results for orders placed during the hospital encounter of 06/30/25    Adult Transthoracic Echo Complete w/ Color, Spectral and Contrast if Necessary Per Protocol 07/01/2025  8:07 AM    Interpretation Summary    Left ventricular systolic function is normal. Calculated left ventricular EF = 50.4%    Left ventricular diastolic function was normal.  Average GLS -16.8%.    Estimated right ventricular systolic pressure from tricuspid regurgitation is normal (<35 mmHg).    No significant valvular abnormalities noted.      Most recent stress test as reviewed and interpreted by me:  Results for orders placed during the hospital encounter of 06/30/25    Stress Test With Myocardial Perfusion One Day 07/01/2025 12:53 PM    Interpretation Summary    Findings consistent with a normal ECG stress test.    Left ventricular ejection fraction is normal (Calculated EF = 62%).    Myocardial perfusion imaging indicates a normal myocardial  perfusion study with no evidence of ischemia. Impressions are consistent with a low risk study.      Most recent cardiac catheterization as reviewed interpreted by me:  No results found for this or any previous visit.    The following portions of the patient's history were reviewed and updated as appropriate: allergies, current medications, past family history, past medical history, past social history, past surgical history, and problem list.      ROS:  14 point review of systems negative except as mentioned above    Current Outpatient Medications:     Acetaminophen 500 MG capsule, Take 1 capsule by mouth Every 6 (Six) Hours As Needed for Mild Pain., Disp: , Rfl:     amLODIPine (NORVASC) 10 MG tablet, Take 1 tablet by mouth Daily., Disp: 30 tablet, Rfl: 0    cetirizine (ZyrTEC Allergy) 10 MG tablet, Take 1 tablet by mouth Daily., Disp: , Rfl:     clonazePAM (KlonoPIN) 0.5 MG tablet, Take 1 tablet by mouth Every 8 (Eight) Hours As Needed., Disp: , Rfl:     cloNIDine (CATAPRES) 0.1 MG tablet, TAKE 1 TABLET BY MOUTH EVERY 6 HOURS FOR BP GREATER THAN 140/90 AND/OR ANXIETY, Disp: , Rfl:     Desvenlafaxine Succinate ER 25 MG tablet sustained-release 24 hour, Take 1 tablet by mouth Daily., Disp: , Rfl:     gabapentin (NEURONTIN) 300 MG capsule, Take 1 capsule by mouth 2 (Two) Times a Day. 1 tab bid and 2 tab qhs., Disp: , Rfl:     gabapentin (NEURONTIN) 300 MG capsule, Take 2 capsules by mouth every night at bedtime. 1 tab bid and 2 tab qhs., Disp: , Rfl:     metFORMIN (GLUCOPHAGE) 500 MG tablet, Take 1 tablet by mouth Daily., Disp: , Rfl:     metoprolol succinate XL (TOPROL-XL) 50 MG 24 hr tablet, Take 1 tablet by mouth Daily., Disp: 30 tablet, Rfl: 0    ondansetron (Zofran) 4 MG tablet, Take 2 tablets by mouth Every 12 (Twelve) Hours As Needed for Nausea or Vomiting., Disp: 30 tablet, Rfl: 6    SUMAtriptan (IMITREX) 100 MG tablet, Take 1 tab at onset of Migraine. May repeat dose one time after 2 hours if Migraine not  stopped.  Max 2/24 or 3 days a week., Disp: 12 tablet, Rfl: 11    triamterene-hydrochlorothiazide (MAXZIDE-25) 37.5-25 MG per tablet, Take 1 tablet by mouth Daily., Disp: 30 tablet, Rfl: 1    vitamin D (ERGOCALCIFEROL) 1.25 MG (98004 UT) capsule capsule, Take 1 capsule by mouth 1 (One) Time Per Week. Mondays, Disp: , Rfl:     Problem List:  Patient Active Problem List   Diagnosis    Benign hypertension    History of migraine    Chest pain     Past Medical History:  Past Medical History:   Diagnosis Date    Cluster headache     Have had for a long time.    CTS (carpal tunnel syndrome) 08/23    Mild case    Headache, tension-type     For a long time    Hypertension     Migraine     For a long time    Sleep apnea 2005     Past Surgical History:  Past Surgical History:   Procedure Laterality Date    HYSTERECTOMY  10/20/2020     Social History:  Social History     Socioeconomic History    Marital status:    Tobacco Use    Smoking status: Never    Smokeless tobacco: Never   Vaping Use    Vaping status: Never Used   Substance and Sexual Activity    Alcohol use: Not Currently     Comment: once a year     Drug use: Never    Sexual activity: Yes     Partners: Male     Birth control/protection: Hysterectomy     Allergies:  Allergies   Allergen Reactions    Lisinopril Unknown - High Severity    Topamax [Topiramate] Other (See Comments)     Marked prior history of kidney stones.  Topamax is contraindicated due to possibility of causing kidney stones.     Immunizations:    There is no immunization history on file for this patient.         In-Office Procedure(s):  No orders to display        ASCVD RIsk Score::  The 10-year ASCVD risk score (Nilton FLROES, et al., 2019) is: 1.1%    Values used to calculate the score:      Age: 45 years      Sex: Female      Is Non- : No      Diabetic: No      Tobacco smoker: No      Systolic Blood Pressure: 150 mmHg      Is BP treated: Yes      HDL Cholesterol: 59 mg/dL       Total Cholesterol: 165 mg/dL    Imaging:    Results for orders placed during the hospital encounter of 06/30/25    XR Chest 1 View 06/30/2025  5:44 PM    Narrative  XR CHEST 1 VW    Date of Exam: 6/30/2025 5:38 PM EDT    Indication: cp    Comparison: 6/23/2025    Findings:  Cardiomediastinal silhouette is unremarkable.  No airspace disease, pneumothorax, nor pleural effusion. No acute osseous abnormality identified.    Impression  Impression:  No active disease.        Electronically Signed: Km Arzate MD  6/30/2025 5:44 PM EDT  Workstation ID: [id number]       Results for orders placed during the hospital encounter of 06/23/25    CT Head Without Contrast 06/23/2025 10:01 AM    Narrative  CT HEAD WO CONTRAST    Date of Exam: 6/23/2025 9:41 AM EDT    Indication: htn, headache.    Comparison: CT brain dated 2/17/2024    Technique: Axial CT images were obtained of the head without contrast administration.  Coronal reconstructions were performed.  Automated exposure control and iterative reconstruction methods were used.      Findings:  There is no evidence of hemorrhage. There is no mass effect or midline shift.    There is no extracerebral collection.    Ventricles are normal in size and configuration for patient's stated age.    Posterior fossa is within normal limits.    Calvarium and skull base appear intact.  Visualized sinuses show no air fluid levels. Visualized orbits are unremarkable.    Impression  Impression:  No acute intracranial abnormality identified.        Electronically Signed: Leo Fermin MD  6/23/2025 10:01 AM EDT  Workstation ID: [id number]      Results for orders placed during the hospital encounter of 06/23/25    CT Head Without Contrast 06/23/2025 10:01 AM    Narrative  CT HEAD WO CONTRAST    Date of Exam: 6/23/2025 9:41 AM EDT    Indication: htn, headache.    Comparison: CT brain dated 2/17/2024    Technique: Axial CT images were obtained of the head without contrast administration.   Coronal reconstructions were performed.  Automated exposure control and iterative reconstruction methods were used.      Findings:  There is no evidence of hemorrhage. There is no mass effect or midline shift.    There is no extracerebral collection.    Ventricles are normal in size and configuration for patient's stated age.    Posterior fossa is within normal limits.    Calvarium and skull base appear intact.  Visualized sinuses show no air fluid levels. Visualized orbits are unremarkable.    Impression  Impression:  No acute intracranial abnormality identified.        Electronically Signed: Leo Fermin MD  6/23/2025 10:01 AM EDT  Workstation ID: [id number]      Lab Review:   Admission on 06/30/2025, Discharged on 07/02/2025   Component Date Value    QT Interval 06/30/2025 348     QTC Interval 06/30/2025 423     Glucose 06/30/2025 104 (H)     BUN 06/30/2025 16.7     Creatinine 06/30/2025 0.73     Sodium 06/30/2025 140     Potassium 06/30/2025 3.8     Chloride 06/30/2025 104     CO2 06/30/2025 22.0     Calcium 06/30/2025 10.0     Total Protein 06/30/2025 7.9     Albumin 06/30/2025 4.4     ALT (SGPT) 06/30/2025 10     AST (SGOT) 06/30/2025 14     Alkaline Phosphatase 06/30/2025 107     Total Bilirubin 06/30/2025 0.2     Globulin 06/30/2025 3.5     A/G Ratio 06/30/2025 1.3     BUN/Creatinine Ratio 06/30/2025 22.9     Anion Gap 06/30/2025 14.0     eGFR 06/30/2025 102.9     Protime 06/30/2025 12.7     INR 06/30/2025 0.96     PTT 06/30/2025 24.8     HS Troponin T 06/30/2025 9     WBC 06/30/2025 15.64 (H)     RBC 06/30/2025 4.96     Hemoglobin 06/30/2025 13.6     Hematocrit 06/30/2025 42.1     MCV 06/30/2025 84.9     MCH 06/30/2025 27.4     MCHC 06/30/2025 32.3     RDW 06/30/2025 13.6     RDW-SD 06/30/2025 42.3     MPV 06/30/2025 11.0     Platelets 06/30/2025 411     Neutrophil % 06/30/2025 73.8     Lymphocyte % 06/30/2025 21.2     Monocyte % 06/30/2025 3.8 (L)     Eosinophil % 06/30/2025 0.5     Basophil % 06/30/2025  0.3     Immature Grans % 06/30/2025 0.4     Neutrophils, Absolute 06/30/2025 11.55 (H)     Lymphocytes, Absolute 06/30/2025 3.31 (H)     Monocytes, Absolute 06/30/2025 0.59     Eosinophils, Absolute 06/30/2025 0.08     Basophils, Absolute 06/30/2025 0.04     Immature Grans, Absolute 06/30/2025 0.07 (H)     nRBC 06/30/2025 0.0     Extra Tube 06/30/2025 Hold for add-ons.     HS Troponin T 06/30/2025 7     Troponin T Numeric Delta 06/30/2025 -2     EF(MOD-bp) 07/01/2025 50.4     LV GLOBAL STRAIN  07/01/2025 -16.8     LVIDd 07/01/2025 3.8     LVIDs 07/01/2025 2.8     IVSd 07/01/2025 0.90     LVPWd 07/01/2025 0.90     FS 07/01/2025 26.3     IVS/LVPW 07/01/2025 1.00     ESV(cubed) 07/01/2025 22.0     LV Sys Vol (BSA correcte* 07/01/2025 23.1     EDV(cubed) 07/01/2025 54.9     LV Lao Vol (BSA correct* 07/01/2025 46.5     LV mass(C)d 07/01/2025 101.1     LVOT area 07/01/2025 3.5     LVOT diam 07/01/2025 2.10     EDV(MOD-sp2) 07/01/2025 76.1     EDV(MOD-sp4) 07/01/2025 98.4     ESV(MOD-sp2) 07/01/2025 37.4     ESV(MOD-sp4) 07/01/2025 48.8     SV(MOD-sp2) 07/01/2025 38.7     SV(MOD-sp4) 07/01/2025 49.6     SVi(MOD-SP2) 07/01/2025 18.3     SVi(MOD-SP4) 07/01/2025 23.4     SVi (LVOT) 07/01/2025 33.2     EF(MOD-sp2) 07/01/2025 50.9     EF(MOD-sp4) 07/01/2025 50.4     LA ESV Index (BP) 07/01/2025 24.1     Med Peak E' [place] 07/01/2025 7.7     Lat Peak E' [place] 07/01/2025 11.2     SV(LVOT) 07/01/2025 70.3     TAPSE (>1.6) 07/01/2025 1.69     RV S' 07/01/2025 14.7     LA dimension (2D)  07/01/2025 3.5     LV V1 max 07/01/2025 118.0     LV V1 max PG 07/01/2025 5.6     LV V1 mean PG 07/01/2025 3.0     LV V1 VTI 07/01/2025 20.3     Ao pk [place] 07/01/2025 149.0     Ao max PG 07/01/2025 8.9     Ao mean PG 07/01/2025 5.0     Ao V2 VTI 07/01/2025 27.4     KENYA(I,D) 07/01/2025 2.6     Dimensionless Index 07/01/2025 0.79     MV max PG 07/01/2025 4.2     MV mean PG 07/01/2025 2.00     MV V2 VTI 07/01/2025 17.7     MVA(VTI) 07/01/2025 4.0     RAP  systole 07/01/2025 3.0     RV V1 max PG 07/01/2025 5.9     RV V1 max 07/01/2025 121.0     RV V1 VTI 07/01/2025 20.7     PA V2 max 07/01/2025 146.0     PA acc time 07/01/2025 0.10     ACS 07/01/2025 1.70     Sinus 07/01/2025 3.0     BH CV STRESS PROTOCOL 1 07/01/2025 Pharmacologic     Stage 1 07/01/2025 1.0     HR Stage 1 07/01/2025 95     BP Stage 1 07/01/2025 184/119     Duration Min Stage 1 07/01/2025 0     Duration Sec Stage 1 07/01/2025 10     Stress Dose Regadenoson * 07/01/2025 0.40     Stress Comments Stage 1 07/01/2025 10 sec bolus injection     Stage 2 07/01/2025 2.0     HR Stage 2 07/01/2025 112     BP Stage 2 07/01/2025 146/108     Duration Min Stage 2 07/01/2025 4     Duration Sec Stage 2 07/01/2025 0     Stress Comments Stage 2 07/01/2025 recovery     Baseline HR 07/01/2025 95     Baseline BP 07/01/2025 184/119     Peak HR 07/01/2025 124     Peak BP 07/01/2025 146/108     Recovery HR 07/01/2025 99     Recovery BP 07/01/2025 164/110     Target HR (85%) 07/01/2025 148     Max. Pred. HR (100%) 07/01/2025 174     Percent Max Pred HR 07/01/2025 71.26     Percent Target HR 07/01/2025 84     BH CV REST NUCLEAR ISOTO* 07/01/2025 11.0     BH CV STRESS NUCLEAR ISO* 07/01/2025 32.2     Nuc Stress EF 07/01/2025 62     QT Interval 07/01/2025 325     QTC Interval 07/01/2025 433     Glucose 07/02/2025 106 (H)     BUN 07/02/2025 21.1 (H)     Creatinine 07/02/2025 0.78     Sodium 07/02/2025 141     Potassium 07/02/2025 4.2     Chloride 07/02/2025 107     CO2 07/02/2025 23.2     Calcium 07/02/2025 9.8     BUN/Creatinine Ratio 07/02/2025 27.1 (H)     Anion Gap 07/02/2025 10.8     eGFR 07/02/2025 95.0     WBC 07/02/2025 13.17 (H)     RBC 07/02/2025 4.58     Hemoglobin 07/02/2025 12.7     Hematocrit 07/02/2025 40.6     MCV 07/02/2025 88.6     MCH 07/02/2025 27.7     MCHC 07/02/2025 31.3 (L)     RDW 07/02/2025 14.2     RDW-SD 07/02/2025 45.6     MPV 07/02/2025 11.1     Platelets 07/02/2025 377     Neutrophil % 07/02/2025  64.7     Lymphocyte % 07/02/2025 28.0     Monocyte % 07/02/2025 5.6     Eosinophil % 07/02/2025 1.1     Basophil % 07/02/2025 0.3     Immature Grans % 07/02/2025 0.3     Neutrophils, Absolute 07/02/2025 8.52 (H)     Lymphocytes, Absolute 07/02/2025 3.69 (H)     Monocytes, Absolute 07/02/2025 0.74     Eosinophils, Absolute 07/02/2025 0.14     Basophils, Absolute 07/02/2025 0.04     Immature Grans, Absolute 07/02/2025 0.04     nRBC 07/02/2025 0.0    Admission on 06/23/2025, Discharged on 06/23/2025   Component Date Value    QT Interval 06/23/2025 368     QTC Interval 06/23/2025 410     Glucose 06/23/2025 110 (H)     BUN 06/23/2025 10.8     Creatinine 06/23/2025 0.68     Sodium 06/23/2025 136     Potassium 06/23/2025 4.5     Chloride 06/23/2025 102     CO2 06/23/2025 22.0     Calcium 06/23/2025 9.4     Total Protein 06/23/2025 7.3     Albumin 06/23/2025 4.0     ALT (SGPT) 06/23/2025 8     AST (SGOT) 06/23/2025 22     Alkaline Phosphatase 06/23/2025 90     Total Bilirubin 06/23/2025 0.3     Globulin 06/23/2025 3.3     A/G Ratio 06/23/2025 1.2     BUN/Creatinine Ratio 06/23/2025 15.9     Anion Gap 06/23/2025 12.0     eGFR 06/23/2025 108.9     Protime 06/23/2025 12.2     INR 06/23/2025 0.92     PTT 06/23/2025 24.0     HS Troponin T 06/23/2025 6     WBC 06/23/2025 14.70 (H)     RBC 06/23/2025 4.51     Hemoglobin 06/23/2025 12.6     Hematocrit 06/23/2025 38.5     MCV 06/23/2025 85.4     MCH 06/23/2025 27.9     MCHC 06/23/2025 32.7     RDW 06/23/2025 13.7     RDW-SD 06/23/2025 42.7     MPV 06/23/2025 10.6     Platelets 06/23/2025 365     Neutrophil % 06/23/2025 75.1     Lymphocyte % 06/23/2025 19.0 (L)     Monocyte % 06/23/2025 4.8 (L)     Eosinophil % 06/23/2025 0.6     Basophil % 06/23/2025 0.2     Immature Grans % 06/23/2025 0.3     Neutrophils, Absolute 06/23/2025 11.04 (H)     Lymphocytes, Absolute 06/23/2025 2.79     Monocytes, Absolute 06/23/2025 0.70     Eosinophils, Absolute 06/23/2025 0.09     Basophils, Absolute  06/23/2025 0.03     Immature Grans, Absolute 06/23/2025 0.05     nRBC 06/23/2025 0.0     HS Troponin T 06/23/2025 7     Troponin T Numeric Delta 06/23/2025 1      Recent labs reviewed and interpreted for clinical significance and application            Level of Care:           Bob Lo MD  07/29/25  .

## 2025-08-01 ENCOUNTER — PATIENT ROUNDING (BHMG ONLY) (OUTPATIENT)
Dept: CARDIOLOGY | Facility: CLINIC | Age: 45
End: 2025-08-01
Payer: OTHER GOVERNMENT

## 2025-08-01 NOTE — PROGRESS NOTES
A My-Chart message has been sent to the patient for PATIENT ROUNDING with [facility] [place].

## 2025-08-20 ENCOUNTER — PROCEDURE VISIT (OUTPATIENT)
Dept: NEUROLOGY | Facility: CLINIC | Age: 45
End: 2025-08-20
Payer: COMMERCIAL

## 2025-08-20 VITALS
BODY MASS INDEX: 49.84 KG/M2 | DIASTOLIC BLOOD PRESSURE: 99 MMHG | SYSTOLIC BLOOD PRESSURE: 160 MMHG | HEIGHT: 61 IN | WEIGHT: 264 LBS | HEART RATE: 80 BPM

## 2025-08-20 DIAGNOSIS — G56.01 CARPAL TUNNEL SYNDROME OF RIGHT WRIST: ICD-10-CM

## 2025-08-20 DIAGNOSIS — G56.02 CARPAL TUNNEL SYNDROME OF LEFT WRIST: Primary | ICD-10-CM

## 2025-08-20 PROBLEM — J45.909 ASTHMA WITHOUT STATUS ASTHMATICUS: Status: ACTIVE | Noted: 2025-08-20

## 2025-08-20 PROBLEM — M54.16 LUMBAR RADICULOPATHY: Status: ACTIVE | Noted: 2025-08-20

## 2025-08-20 PROBLEM — G47.33 OBSTRUCTIVE SLEEP APNEA SYNDROME: Status: ACTIVE | Noted: 2025-08-20

## 2025-08-20 PROBLEM — F41.8 MIXED ANXIETY AND DEPRESSIVE DISORDER: Status: ACTIVE | Noted: 2025-08-20

## 2025-08-20 PROBLEM — Z87.442 HISTORY OF RENAL CALCULI: Status: ACTIVE | Noted: 2025-08-20

## 2025-08-20 PROBLEM — G43.809 MIGRAINE VARIANT WITH HEADACHE: Status: ACTIVE | Noted: 2025-08-20

## 2025-08-20 PROBLEM — M47.819 OSTEOARTHRITIS OF SPINAL FACET JOINT: Status: ACTIVE | Noted: 2025-08-20
